# Patient Record
Sex: MALE
[De-identification: names, ages, dates, MRNs, and addresses within clinical notes are randomized per-mention and may not be internally consistent; named-entity substitution may affect disease eponyms.]

---

## 2018-10-03 ENCOUNTER — HOSPITAL ENCOUNTER (OUTPATIENT)
Dept: HOSPITAL 42 - SDS | Age: 68
Discharge: HOME | End: 2018-10-03
Attending: RADIOLOGY
Payer: MEDICARE

## 2018-10-03 VITALS — TEMPERATURE: 97.6 F

## 2018-10-03 VITALS
SYSTOLIC BLOOD PRESSURE: 135 MMHG | DIASTOLIC BLOOD PRESSURE: 79 MMHG | OXYGEN SATURATION: 95 % | RESPIRATION RATE: 20 BRPM | HEART RATE: 76 BPM

## 2018-10-03 VITALS — BODY MASS INDEX: 22.9 KG/M2

## 2018-10-03 DIAGNOSIS — C34.01: Primary | ICD-10-CM

## 2018-10-03 DIAGNOSIS — Z90.2: ICD-10-CM

## 2018-10-03 LAB
APTT BLD: 24.6 SECONDS (ref 25.1–36.5)
BASOPHILS # BLD AUTO: 0.04 K/MM3 (ref 0–2)
BASOPHILS NFR BLD: 0.4 % (ref 0–3)
BUN SERPL-MCNC: 29 MG/DL (ref 7–21)
CALCIUM SERPL-MCNC: 11 MG/DL (ref 8.4–10.5)
EOSINOPHIL # BLD: 0.1 10*3/UL (ref 0–0.7)
EOSINOPHIL NFR BLD: 1 % (ref 1.5–5)
ERYTHROCYTE [DISTWIDTH] IN BLOOD BY AUTOMATED COUNT: 14.4 % (ref 11.5–14.5)
GFR NON-AFRICAN AMERICAN: > 60
GRANULOCYTES # BLD: 7.75 10*3/UL (ref 1.4–6.5)
GRANULOCYTES NFR BLD: 68.6 % (ref 50–68)
HGB BLD-MCNC: 15.4 G/DL (ref 14–18)
INR PPP: 1.09
LYMPHOCYTES # BLD: 2.6 10*3/UL (ref 1.2–3.4)
LYMPHOCYTES NFR BLD AUTO: 22.8 % (ref 22–35)
MCH RBC QN AUTO: 23.4 PG (ref 25–35)
MCHC RBC AUTO-ENTMCNC: 31.2 G/DL (ref 31–37)
MCV RBC AUTO: 75.2 FL (ref 80–105)
MONOCYTES # BLD AUTO: 0.8 10*3/UL (ref 0.1–0.6)
MONOCYTES NFR BLD: 7.2 % (ref 1–6)
PLATELET # BLD: 172 10^3/UL (ref 120–450)
PMV BLD AUTO: 10.7 FL (ref 7–11)
PROTHROMBIN TIME: 12.4 SECONDS (ref 9.4–12.5)
RBC # BLD AUTO: 6.57 10^6/UL (ref 3.5–6.1)
WBC # BLD AUTO: 11.3 10^3/UL (ref 4.5–11)

## 2018-10-03 PROCEDURE — 71045 X-RAY EXAM CHEST 1 VIEW: CPT

## 2018-10-03 PROCEDURE — 88305 TISSUE EXAM BY PATHOLOGIST: CPT

## 2018-10-03 PROCEDURE — 85730 THROMBOPLASTIN TIME PARTIAL: CPT

## 2018-10-03 PROCEDURE — 36415 COLL VENOUS BLD VENIPUNCTURE: CPT

## 2018-10-03 PROCEDURE — 80048 BASIC METABOLIC PNL TOTAL CA: CPT

## 2018-10-03 PROCEDURE — 85610 PROTHROMBIN TIME: CPT

## 2018-10-03 PROCEDURE — 32405: CPT

## 2018-10-03 PROCEDURE — 77012 CT SCAN FOR NEEDLE BIOPSY: CPT

## 2018-10-03 PROCEDURE — 85025 COMPLETE CBC W/AUTO DIFF WBC: CPT

## 2018-10-03 NOTE — CT
PROCEDURE:  CT guided right lung biopsy.



HISTORY:

Previous left pneumonectomy for lung C Large right hilar mass.  

Evaluate for carcinoma. 



PHYSICIAN(S):  Samir Amos MD.



TECHNIQUE:

The relative risks and indications of the procedure were explained to 

the patient and consent obtained. The patient was placed prone on the 

CT scanner and preliminary images through the mid chest obtained. 

Conscious sedation and monitoring were provided throughout the 

procedure by a nurse.



There is an 8 cm right hilar mass.  The patient is status post left 

pneumonectomy..  A right posterior approach was selected and the area 

prepped and draped in the usual sterile fashion. 1% Xylocaine was 

used to anesthetize the skin and soft tissues. A 19 gauge guiding 

needle was advanced into the 8 cm right hilar mass.  Its position was 

confirmed with CT. Using coaxial technique, multiple core biopsies 

were obtained. The postprocedure images show no evidence of large 

pneumothorax or significant hemorrhage..



IMPRESSION:

1. CT-guided right lung biopsy as described above.

## 2018-10-04 NOTE — RAD
Date of service: 



10/03/2018



HISTORY:

 rt lung bx 



COMPARISON:

CT scan earlier same day 



FINDINGS:



LUNGS:

Complete opacification of the left lung.



PLEURA:

No evidence of pneumothorax



CARDIOVASCULAR:

Normal.



OSSEOUS STRUCTURES:

No significant abnormalities.



VISUALIZED UPPER ABDOMEN:

Normal.



OTHER FINDINGS:

None.



IMPRESSION:

There is no evidence of post biopsy pneumothorax on the right

## 2018-10-08 ENCOUNTER — HOSPITAL ENCOUNTER (INPATIENT)
Dept: HOSPITAL 42 - ED | Age: 68
LOS: 9 days | Discharge: NURSING, REHAB OR LONG-TERM CARE | DRG: 974 | End: 2018-10-17
Attending: INTERNAL MEDICINE | Admitting: INTERNAL MEDICINE
Payer: MEDICARE

## 2018-10-08 VITALS — BODY MASS INDEX: 23.3 KG/M2

## 2018-10-08 DIAGNOSIS — R65.20: ICD-10-CM

## 2018-10-08 DIAGNOSIS — I11.0: ICD-10-CM

## 2018-10-08 DIAGNOSIS — C78.1: ICD-10-CM

## 2018-10-08 DIAGNOSIS — A41.9: Primary | ICD-10-CM

## 2018-10-08 DIAGNOSIS — Z66: ICD-10-CM

## 2018-10-08 DIAGNOSIS — Z90.2: ICD-10-CM

## 2018-10-08 DIAGNOSIS — C77.1: ICD-10-CM

## 2018-10-08 DIAGNOSIS — E11.9: ICD-10-CM

## 2018-10-08 DIAGNOSIS — Z99.81: ICD-10-CM

## 2018-10-08 DIAGNOSIS — Z79.84: ICD-10-CM

## 2018-10-08 DIAGNOSIS — J18.9: ICD-10-CM

## 2018-10-08 DIAGNOSIS — E87.6: ICD-10-CM

## 2018-10-08 DIAGNOSIS — Z87.891: ICD-10-CM

## 2018-10-08 DIAGNOSIS — C34.31: ICD-10-CM

## 2018-10-08 DIAGNOSIS — J44.1: ICD-10-CM

## 2018-10-08 DIAGNOSIS — J96.01: ICD-10-CM

## 2018-10-08 DIAGNOSIS — I50.9: ICD-10-CM

## 2018-10-08 DIAGNOSIS — B20: ICD-10-CM

## 2018-10-08 LAB
ALBUMIN SERPL-MCNC: 4.1 G/DL (ref 3–4.8)
ALBUMIN/GLOB SERPL: 0.9 {RATIO} (ref 1.1–1.8)
ALT SERPL-CCNC: 26 U/L (ref 7–56)
ARTERIAL BLOOD GAS O2 SAT: 96.1 % (ref 95–98)
ARTERIAL BLOOD GAS O2 SAT: 99.3 % (ref 95–98)
ARTERIAL BLOOD GAS PCO2: 45 MM/HG (ref 35–45)
ARTERIAL BLOOD GAS PCO2: 48 MM/HG (ref 35–45)
ARTERIAL BLOOD GAS TCO2: 26.8 MMOL.L (ref 22–28)
ARTERIAL BLOOD GAS TCO2: 26.8 MMOL.L (ref 22–28)
AST SERPL-CCNC: 34 U/L (ref 17–59)
BASE EXCESS BLDV CALC-SCNC: -0.7 MMOL/L (ref 0–2)
BASE EXCESS BLDV CALC-SCNC: 1.1 MMOL/L (ref 0–2)
BASOPHILS # BLD AUTO: 0.03 K/MM3 (ref 0–2)
BASOPHILS NFR BLD: 0.2 % (ref 0–3)
BNP SERPL-MCNC: 473 PG/ML (ref 0–450)
BUN SERPL-MCNC: 25 MG/DL (ref 7–21)
CALCIUM SERPL-MCNC: 8.8 MG/DL (ref 8.4–10.5)
EOSINOPHIL # BLD: 0.3 10*3/UL (ref 0–0.7)
EOSINOPHIL NFR BLD: 2.1 % (ref 1.5–5)
ERYTHROCYTE [DISTWIDTH] IN BLOOD BY AUTOMATED COUNT: 14.5 % (ref 11.5–14.5)
GFR NON-AFRICAN AMERICAN: > 60
GRANULOCYTES # BLD: 11.31 10*3/UL (ref 1.4–6.5)
GRANULOCYTES NFR BLD: 80.8 % (ref 50–68)
HCO3 BLDA-SCNC: 25.3 MMOL/L (ref 21–28)
HCO3 BLDA-SCNC: 25.4 MMOL/L (ref 21–28)
HGB BLD-MCNC: 16.7 G/DL (ref 14–18)
INHALED O2 CONCENTRATION: 100 %
INHALED O2 CONCENTRATION: 100 %
LYMPHOCYTES # BLD: 1.7 10*3/UL (ref 1.2–3.4)
LYMPHOCYTES NFR BLD AUTO: 11.9 % (ref 22–35)
MCH RBC QN AUTO: 23.6 PG (ref 25–35)
MCHC RBC AUTO-ENTMCNC: 31.5 G/DL (ref 31–37)
MCV RBC AUTO: 75.1 FL (ref 80–105)
MONOCYTES # BLD AUTO: 0.7 10*3/UL (ref 0.1–0.6)
MONOCYTES NFR BLD: 5 % (ref 1–6)
PH BLDA: 7.33 [PH] (ref 7.35–7.45)
PH BLDA: 7.36 [PH] (ref 7.35–7.45)
PH BLDV: 7.24 [PH] (ref 7.32–7.43)
PH BLDV: 7.32 [PH] (ref 7.32–7.43)
PLATELET # BLD: 196 10^3/UL (ref 120–450)
PMV BLD AUTO: 11.4 FL (ref 7–11)
PO2 BLDA: 153 MM/HG (ref 80–100)
PO2 BLDA: 72 MM/HG (ref 80–100)
RBC # BLD AUTO: 7.07 10^6/UL (ref 3.5–6.1)
TROPONIN I SERPL-MCNC: 0.06 NG/ML
VENOUS BLOOD FIO2: 21 %
VENOUS BLOOD FIO2: 21 %
VENOUS BLOOD GAS PCO2: 55 (ref 40–60)
VENOUS BLOOD GAS PCO2: 66 (ref 40–60)
VENOUS BLOOD GAS PO2: 32 MM/HG (ref 30–55)
VENOUS BLOOD GAS PO2: 67 MM/HG (ref 30–55)
WBC # BLD AUTO: 14 10^3/UL (ref 4.5–11)

## 2018-10-08 PROCEDURE — 5A09357 ASSISTANCE WITH RESPIRATORY VENTILATION, LESS THAN 24 CONSECUTIVE HOURS, CONTINUOUS POSITIVE AIRWAY PRESSURE: ICD-10-PCS | Performed by: INTERNAL MEDICINE

## 2018-10-08 RX ADMIN — MEROPENEM SCH MLS/HR: 1 INJECTION INTRAVENOUS at 14:00

## 2018-10-08 RX ADMIN — MEROPENEM SCH MLS/HR: 1 INJECTION INTRAVENOUS at 21:36

## 2018-10-08 RX ADMIN — METHYLPREDNISOLONE SODIUM SUCCINATE SCH MG: 40 INJECTION, POWDER, FOR SOLUTION INTRAMUSCULAR; INTRAVENOUS at 21:37

## 2018-10-08 RX ADMIN — METHYLPREDNISOLONE SODIUM SUCCINATE SCH MG: 40 INJECTION, POWDER, FOR SOLUTION INTRAMUSCULAR; INTRAVENOUS at 15:00

## 2018-10-08 RX ADMIN — IPRATROPIUM BROMIDE AND ALBUTEROL SULFATE SCH ML: .5; 3 SOLUTION RESPIRATORY (INHALATION) at 13:48

## 2018-10-08 RX ADMIN — IPRATROPIUM BROMIDE AND ALBUTEROL SULFATE SCH ML: .5; 3 SOLUTION RESPIRATORY (INHALATION) at 19:44

## 2018-10-08 NOTE — RAD
Date of service: 



10/08/2018



HISTORY:

 sob 



COMPARISON:

10/03/2018 



FINDINGS:



LUNGS:

There is a new patchy alveolar infiltrate in the right lung.  There 

is no change in the opacified left lung



PLEURA:

No significant pleural effusion identified, no pneumothorax apparent.



CARDIOVASCULAR:

Normal.



OSSEOUS STRUCTURES:

No significant abnormalities.



VISUALIZED UPPER ABDOMEN:

Normal.



OTHER FINDINGS:

None.



IMPRESSION:

There is a new patchy alveolar infiltrate in the right lung.  There 

is no change in the opacified left lung

## 2018-10-08 NOTE — CON
DATE:  10/08/2018

PULMONARY CONSULTATION



REASON FOR PULMONARY CONSULTATION:  Shortness of breath.



REFERRING PHYSICIAN:  Rodney Lawrence MD.



HISTORY OF PRESENT ILLNESS:  History is obtained via extensive discussion

with the patient and his family.  I have also reviewed the chart at length.



The patient is a chronically ill 68-year-old male, with past medical history

significant for advanced lung cancer (status post left pneumonectomy in

2014); newly diagnosed/large cancer recurrence in the right lung; advanced

chronic obstructive pulmonary disease, on home oxygen; hypertension; HIV

positivity; who presents to [facility] with a 1-day history of

increasing shortness of breath at rest, dyspnea on exertion and cough. 

There is no history of significant sputum production.  There is no history

of chest pain, coughing up of blood or chest pain - made worse with deep

respirations.  The patient did present to [facility] with

low-grade temperatures (99.0).  No history of chills or infectious exposure. 

No history of night sweats, weight loss or appetite change prior to the

above events.  No history of calf pains.  No history of syncope or

diaphoresis.  No history of recent travel or trauma.



REVIEW OF SYSTEMS:  No history of nausea, vomiting or diarrhea.  No acute

urinary symptoms.  No new neurologic complaints.  Rest of the review of

systems is negative.



ALLERGIES:  NO KNOWN ALLERGIES.



SOCIAL HISTORY:  Positive for extensive tobacco usage.  No alcohol. 

Previous intravenous drug abuse - many years ago.



FAMILY HISTORY:  No inheritable diseases.



HOME MEDICATIONS:  Include Percocet, Glucophage, Valium, Norvasc, Breo

Ellipta, Ventolin HFA and Atripla.



PHYSICAL EXAMINATION:

GENERAL:  The patient is mildly short of breath at rest.  He is no acute

distress.  He is currently on BiPAP.

VITAL SIGNS:  Temperature is 99, pulse on the monitor is 101, respiratory

rate 20/22, blood pressure 115/77.  Oxygen saturation on BiPAP is 95%.

HEENT:  Normocephalic, atraumatic.  No JVD.

CARDIOVASCULAR:  Positive S1, S2.  No S3 gallop.

LUNGS:  Scattered rhonchi with a few wheezes noted in the right lung. 

Decreased breath sounds - left lung.

EXTREMITIES:  No clubbing, cyanosis or edema.  Calves are nontender to

palpation.

GI:  Abdomen is soft, nontender and nondistended.  Bowel sounds are

positive.

SKIN:  No acute rash.

NEUROLOGIC:  Limited at the present time.



PERTINENT LABORATORY DATA:  Chest x-ray was done today and reviewed. 

Compared to the chest x-ray done on 10/03/2018, the chest x-ray today

appears to show a new right lower lobe infiltrate.  The patient also had a

recent PET scan done on 09/26/2018.  The patient has a very large (approximately

9 cm) multilobulated FDG-avid mass noted in the right lower lobe with

extension to the right hilum.  There is also FDG-avid metastatic

adenopathy noted in the mediastinum and the hilum.  Lung biopsy of the mass was

done on 10/03/2018.  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

carcinoma.  CBC:  White count 14K, hemoglobin 16.7, hematocrit 53.1,

platelets of 196,000.  Arterial blood gas was done on 100% oxygen.  Results

are:  PH 7.33, pCO2 of 48, pO2 of 72.  Complete metabolic profile:  BUN 25,

glucose 253.  Troponin 0.06.  B-type natriuretic peptide 473, total protein

8.4.  Rest of the metabolic profile is within normal limits.



IMPRESSION:

1.  Acute hypoxemic respiratory failure.

2.  Rule out pneumonia, right lower lobe.

3.  Advanced lung cancer.

4.  Advanced chronic obstructive pulmonary disease - on home oxygen.

5.  Mild bronchospasm.



PLAN:  Again, I did discuss the case with the patient and family at length.

I have also reviewed the chart at length.



The patient presents to [facility] with a 1-day history of

increasing pulmonary symptoms.  Again, I did review the chest x-ray - done

earlier today.  Compared to the chest x-ray done on 10/03/2018, the newest

chest x-ray shows a  right lower lobe infiltrate (new).  I have

also reviewed the recent PET scan done.  A very large right lung mass with

metastatic disease to the mediastinum and hilum are noted.  I have also

reviewed the arterial blood gas.  A mild respiratory acidosis is noted,

along with a significant increase in the alveolar-arterial gradient.  Since

the arterial blood gas was done, the patient was placed on BiPAP.  On

physical exam, there is certainly mild bronchospasm noted.  The patient

also got vancomycin and Zosyn in the emergency room.  Pancultures are

ordered. Infectious disease consult is also ordered.



The patient appears critically ill, with overall poor prognosis.  I did have

a private conversation with the wife this morning.  She appears realistic,

knowing that her  has an advanced cancer.  I also discussed the case

at length with Dr. Martinez (ICU).  The ICU team will write the admitting

orders shortly.  The patient will be transitioned to the ICU as soon as 
possible. 

I will also discuss the above with the attending physician.  Thank you very

much for this pulmonary consultation.





__________________________________________

Bennie Shepherd MD





DD:  10/08/2018 9:23:21

DT:  10/08/2018 9:29:47

Job # [id number]



[email or web address]

## 2018-10-08 NOTE — PCM.SEPTIC
Sepsis Progress Note





- Reassessment Type


Date of Evaluation: 10/08/18


Time of Evaluation: 13:00


Reassessment Type: Non-invasive reassessment





- Non Invasive Reassessment


Were the most recent vital sign reviewed: Yes


Vital Sign (Latest): 


                                        











Temp Pulse Resp BP Pulse Ox


 


 99.0 F   101 H  22   120/84   100 


 


 10/08/18 07:19  10/08/18 11:19  10/08/18 11:19  10/08/18 11:19  10/08/18 11:19








Cardiovascular: Yes: Regular Rate, Rhythm, Tachycardia


Respiratory: Yes: Normal Breath Sounds, Other (w/ face mask)


Capillary Refill: Normal (Less than 2 sec)


Skin: Normal Color, Warm


Fluid Challenge performed: No

## 2018-10-08 NOTE — PCM.SEPTIC
Sepsis Progress Note





- Non Invasive Reassessment


Vital Sign (Latest): 


                                        











Temp Pulse Resp BP Pulse Ox


 


 99.0 F   101 H  22   120/84   100 


 


 10/08/18 07:19  10/08/18 11:19  10/08/18 11:19  10/08/18 11:19  10/08/18 11:19

## 2018-10-08 NOTE — US
HISTORY:

Leg pain and swelling. Evaluate for DVT



PHYSICIAN(S):  Samir Amos MD.



TECHNIQUE:

Duplex sonography and color-flow Doppler with graded compression were 

used to evaluate the deep venous systems of both lower extremities. 



FINDINGS:

The visualized deep venous systems of both lower extremities are 

sonographically normal and compressible. Normal wave forms and 

augmentation are seen. There is no sonographic evidence for deep 

venous thrombosis in the visualized segments of both lower 

extremities.



IMPRESSION:

No sonographic evidence for deep venous thrombosis in the visualized 

segments of both lower extremities.

## 2018-10-08 NOTE — CARD
--------------- APPROVED REPORT --------------





Date of service: 10/08/2018



EKG Measurement

Heart [id number]

OH [id number]

[id number]

[id number]

[id number]



<Conclusion>

Sinus tachycardia

Rightward axis

Voltage criteria for left ventricular hypertrophy

Cannot rule out Septal infarct, age undetermined

Marked ST abnormality, 

Correlate Clinically.

Abnormal ECG

## 2018-10-08 NOTE — CP.PCM.CON
History of Present Illness





- History of Present Illness


History of Present Illness: 


Shivam Dow DO, PGY-1 ICU Consult Note for Dr. Martinez


Patient is a 68 year old M with PMH of metastatic lung cancer (s/p L lung 

resection, now with newly diagnosed 9 cm lung mass in R lung), HTN, and HIV who 

presents to ED with SOB since awakening for the past 2 hours. Patient states he 

woke up this morning and was short of breath at rest. When he tried to get up to

the bathroom he was hardly able to move without gasping for air. He admits to 

being diaphoretic at the time and states he took a baby aspirin because he 

though the might be having a heart attack. He was recently being treated with 

levaquin for presumed PNA but states that Dr. Lawrence told him that it was a 

recurrence of cancer on the right and not PNA. Levaquin was stopped.


In ED, he was placed on Bipap and patient states his breathing has improved 

since. He states his oncologist practices out of [facility] [place] but states they chose to 

come to this hospital because it was closer. 


Currently patient denies fever/chills, CP, nausea/vomiting/diarrhea, 

diaphoresis, peripheral swelling, HA or vision changes.





PMH: metastatic lung cancer, HTN, HIV


PSH: L lung resection


All: NKA


Fam Hx: non-contributory


Meds: reviewed with patient's family, consistent with EMR








Review of Systems





- Constitutional


Constitutional: absent: Chills, Fever, Headache, Night Sweats





- EENT


Eyes: absent: Change in Vision


Nose/Mouth/Throat: As Per HPI





- Cardiovascular


Cardiovascular: absent: Chest Pain, Dyspnea





- Respiratory


Respiratory: As Per HPI





- Gastrointestinal


Gastrointestinal: absent: Abdominal Pain, Nausea, Vomiting





- Genitourinary


Genitourinary: absent: Change in Urinary Stream





Past Patient History





- Infectious Disease


Hx of Infectious Diseases: None





- Past Social History


Smoking Status: Unknown If Ever Smoked





- CARDIAC


Hx Pacemaker: No





- PULMONARY


Hx Lung Cancer: Yes


Other/Comment: Left pneumonectomy.  Mass noted to the Rt. lung





- HEMATOLOGICAL/ONCOLOGICAL


Hx Blood Transfusions: No





- MUSCULOSKELETAL/RHEUMATOLOGICAL


Hx Musculoskeletal Disorders: No





- PSYCHIATRIC


Hx Emotional Abuse: No


Hx Physical Abuse: No





- SURGICAL HISTORY


Hx Surgeries: Yes (LEFT PNEUMONECTOMY, BILATERAL MENISCUS SURGERY)





- ANESTHESIA


Hx Anesthesia Reactions: No





Meds


Allergies/Adverse Reactions: 


                                    Allergies











Allergy/AdvReac Type Severity Reaction Status Date / Time


 


No Known Allergies Allergy   Verified 10/08/18 07:29














- Medications


Medications: 


                               Current Medications





Vancomycin HCl (Vancomycin 1gm)  1 gm in 250 mls @ 167 mls/hr IVPB STAT STA; 

Protocol


   Stop: 10/08/18 09:32


Sodium Chloride (Sodium Chloride 0.9%)  1,000 mls @ 100 mls/hr IV .Q10H ALBINA


   Last Admin: 10/08/18 08:14 Dose:  100 mls/hr











Physical Exam





- Constitutional


Appears: No Acute Distress, Agitated





- Head Exam


Head Exam: ATRAUMATIC, NORMAL INSPECTION





- Eye Exam


Eye Exam: EOMI, PERRL





- ENT Exam


ENT Exam: Mucous Membranes Dry





- Neck Exam


Neck exam: Positive for: Full Rom, Normal Inspection





- Respiratory Exam


Respiratory Exam: Accessory Muscle Use, Decreased Breath Sounds (breath sounds 

absent on L, c/w L left resection), Prolonged Expiratory Phase.  absent: Rales, 

Rhonchi, Wheezes, Stridor





- Cardiovascular Exam


Cardiovascular Exam: REGULAR RHYTHM, RRR, +S1, +S2.  absent: Diastolic murmur, 

Gallop, Rubs, Systolic Murmur





- GI/Abdominal Exam


GI & Abdominal Exam: Soft.  absent: Guarding, Rebound





- Extremities Exam


Extremities exam: Positive for: full ROM, normal inspection.  Negative for: 

pedal edema





- Neurological Exam


Neurological exam: Alert, Oriented x3





- Psychiatric Exam


Psychiatric exam: Anxious





- Skin


Skin Exam: Dry, Intact, Normal Color, Warm





Results





- Vital Signs


Recent Vital Signs: 


                                Last Vital Signs











Temp  99.0 F   10/08/18 07:19


 


Pulse  125 H  10/08/18 07:40


 


Resp  22   10/08/18 07:40


 


BP  115/77   10/08/18 07:40


 


Pulse Ox  95   10/08/18 07:40














- Labs


Result Diagrams: 


                                 10/08/18 07:15





                                 10/08/18 07:15


Labs: 


                         Laboratory Results - last 24 hr











  10/08/18 10/08/18 10/08/18





  07:11 07:15 07:15


 


WBC   14.0 H D 


 


RBC   7.07 H 


 


Hgb   16.7 


 


Hct   53.1 H 


 


MCV   75.1 L 


 


MCH   23.6 L 


 


MCHC   31.5 


 


RDW   14.5 


 


Plt Count   196 


 


MPV   11.4 H 


 


Gran %   80.8 H 


 


Lymph % (Auto)   11.9 L 


 


Mono % (Auto)   5.0 


 


Eos % (Auto)   2.1 


 


Baso % (Auto)   0.2 


 


Gran #   11.31 H 


 


Lymph # (Auto)   1.7 


 


Mono # (Auto)   0.7 H 


 


Eos # (Auto)   0.3 


 


Baso # (Auto)   0.03 


 


pCO2   


 


pO2  32  


 


HCO3   


 


ABG pH   


 


ABG Total CO2   


 


ABG O2 Saturation   


 


ABG Base Excess   


 


ABG Potassium   


 


VBG pH  7.24 L  


 


VBG pCO2  66.0 H*  


 


VBG HCO3  28.3 H  


 


VBG Total CO2  30.3 H  


 


VBG O2 Sat (Calc)  59.6  


 


VBG Base Excess  -0.7 L  


 


VBG Potassium  4.4  


 


Sodium  141.0   144


 


Chloride  106.0   106


 


Glucose  257 H  


 


Lactate  2.3 H  


 


FiO2  21.0  


 


Potassium    4.1


 


Carbon Dioxide    28


 


Anion Gap    14


 


BUN    25 H


 


Creatinine    1.0


 


Est GFR ( Amer)    > 60


 


Est GFR (Non-Af Amer)    > 60


 


Random Glucose    253 H


 


Calcium    8.8


 


Total Bilirubin    0.6


 


AST    34


 


ALT    26


 


Alkaline Phosphatase    126  D


 


Troponin I    0.06


 


NT-Pro-B Natriuret Pep    473 H


 


Total Protein    8.4 H


 


Albumin    4.1


 


Globulin    4.4


 


Albumin/Globulin Ratio    0.9 L


 


TSH 3rd Generation   


 


Arterial Blood Potassium   


 


Venous Blood Potassium  4.4  


 


Blood Type   


 


Antibody Screen   


 


BBK History Checked   














  10/08/18 10/08/18 10/08/18





  07:15 07:15 07:45


 


WBC   


 


RBC   


 


Hgb   


 


Hct   


 


MCV   


 


MCH   


 


MCHC   


 


RDW   


 


Plt Count   


 


MPV   


 


Gran %   


 


Lymph % (Auto)   


 


Mono % (Auto)   


 


Eos % (Auto)   


 


Baso % (Auto)   


 


Gran #   


 


Lymph # (Auto)   


 


Mono # (Auto)   


 


Eos # (Auto)   


 


Baso # (Auto)   


 


pCO2    48 H


 


pO2    72.0 L


 


HCO3    25.3


 


ABG pH    7.33 L


 


ABG Total CO2    26.8


 


ABG O2 Saturation    96.1


 


ABG Base Excess    -1.1


 


ABG Potassium    3.4 L


 


VBG pH   


 


VBG pCO2   


 


VBG HCO3   


 


VBG Total CO2   


 


VBG O2 Sat (Calc)   


 


VBG Base Excess   


 


VBG Potassium   


 


Sodium    141.0


 


Chloride    110.0 H


 


Glucose    205 H


 


Lactate    1.1


 


FiO2    100.0


 


Potassium   


 


Carbon Dioxide   


 


Anion Gap   


 


BUN   


 


Creatinine   


 


Est GFR ( Amer)   


 


Est GFR (Non-Af Amer)   


 


Random Glucose   


 


Calcium   


 


Total Bilirubin   


 


AST   


 


ALT   


 


Alkaline Phosphatase   


 


Troponin I   


 


NT-Pro-B Natriuret Pep   


 


Total Protein   


 


Albumin   


 


Globulin   


 


Albumin/Globulin Ratio   


 


TSH 3rd Generation  2.30  


 


Arterial Blood Potassium    3.4 L


 


Venous Blood Potassium   


 


Blood Type   B POSITIVE 


 


Antibody Screen   Negative 


 


BBK History Checked   No verified bt 














Assessment & Plan





- Assessment and Plan (Free Text)


Assessment: 





67 yo M with PMH of metastatic lung CA, HTN, and HIV presents to ED with acute, 

worsening SOB with hypoxemia improving with Bipap admitted to ICU for additional

management/monitoring.


Plan: 





Neuro:


-AAOx3, no FND, moving extremities past midline.  


-Monitor neuro status.   


-Reorient patient as necessary.





Cardio:


-Tachycardia likely 2/2 anxiety and/or physiologic response to hypoxemia


-Hypertensive on admission, improving


-Maintain MAP > 65


-Monitor HR in MICU





Pulm:


-Acute hypercapnic respiratory failure likely 2/2 underlying tumor burden vs 

component of COPD exacerbation


-Faint expiratory wheezes on right, absent breath sounds LLL c/w hx of lobectomy


-Improving on Bipap


-Will start on empiric antibiotics, duo-neb Q6h, and solumedrol 40 mg IVP Q8h 


-Maintain SpO2 > 95%


-Will monitor respiratory status in MICU





GI:


-Protonix for PPX





/Nephro:


-Maintain euvolemia


-BUN/Cr stable


-Monitor UOP


-Replete electrolytes as needed





Heme/Onc:


-Patient states he has a newly diagnosed 9 cm mass in the R lung


-Patient's primary oncologist in  is Dr. Reynoso and pulmonologist is Dr. Vizcarra


-H/H stable currently, continue to monitor





ID:


-Leukocytosis of 14 noted


-CXR with possible RLL infiltrate vs mass


-Treated empirically with vanc and zosyn


-Azithromycin for presumed COPD exacerbation


-ID consulted recs appreciated





GI/DVT PPX: Protonix and SC heparin


Full Code


Monitor in MICU





Case and plan discussed with my attending Dr. Michelle Dow, DO


IM Resident PGY-1

## 2018-10-08 NOTE — ED PDOC
Arrival/HPI





- General


Chief Complaint: Shortness Of Breath


Time Seen by Provider: 10/08/18 07:10


Historian: Patient





- Critical Care


Critical Care Minutes: 30 minutes





- History of Present Illness


Narrative History of Present Illness (Text): 





68 yr old male w/ hx of Lung ca on home o2 3L, L Lobectomy, HTN, HIV p/w SOB. 

Acute SOB per EMS and family this morning 30 minutes prior to arrival, desat to 

59%. Denies any chest pain or abdominal pain or Headache. No recent fever chills

or night sweat. No Nausea or vomiting. Pt was recently rx w/ levoquin 3 weeks 

prior for ?PNA, but was found to have re-occurence of CA, 9cm mass to R lung. Pt

had chemo scheduled to be started. Per family pts Viral load has been low and 

last Cd4 count was very good.  





PMD: Howard


Pulm: Zackery ([facility] [place])


Onc: Angeles ([facility] [place])











Past Medical History





- Provider Review


Nursing Documentation Reviewed: Yes





- Cardiac


Hx Pacemaker: No





- Hematological/Oncological


Hx Blood Transfusions: No





- Musculoskeletal/Rheumatological


Hx Musculoskeletal Disorders: No





- Psychiatric


Hx Emotional Abuse: No


Hx Physical Abuse: No





- Anesthesia


Hx Anesthesia Reactions: No





- Suicidal Assessment


Feels Threatened In Home Enviroment: No





Family/Social History


Family/Social History: Unknown Family HX


Hx Alcohol Use: No





Allergies/Home Meds


Allergies/Adverse Reactions: 


Allergies





No Known Allergies Allergy (Verified 10/08/18 07:29)


   








Home Medications: 


                                    Home Meds











 Medication  Instructions  Recorded  Confirmed


 


Albuterol Sulfate [Ventolin Hfa] 1 puff IH QID 10/01/18 10/08/18


 


Breo Ellipta 100-25 Mcg INH 1 puff INH BID 10/01/18 10/08/18


 


Efavirenz/Emtricitabine/Teno 1 tab PO DAILY 10/01/18 10/08/18





[Atripla 600 MG-200 MG-300 MG]   


 


Ibuprofen/Diphenhydramine Cit 1 each PO HS 10/01/18 10/08/18





[Advil Pm Caplet]   


 


Multivitamin/Iron/Folic Acid 1 tab PO DAILY 10/01/18 10/08/18





[Centrum Adults Tablet]   


 


amLODIPine [Norvasc] 5 mg PO DAILY 10/01/18 10/08/18


 


diaZEpam [Valium] 5 mg PO HS 10/01/18 10/08/18


 


metFORMIN [glucOPHAGE] 500 mg PO DAILY 10/01/18 10/08/18


 


oxyCODONE/Acetaminophen [Percocet 1 tab PO PRN PRN 10/01/18 10/08/18





5/325 mg Tab]   














Review of Systems





- Review of Systems


Systems not reviewed;Unavailable: Acuity of Condition





Physical Exam


Blood Pressure: Hypertensive


Pulse: Tachycardic


Respiratory Rate: Tachypneic


Appearance: Positive for: Ill-Appearing


Pain Distress: None


Mental Status: Positive for: Alert and Oriented X 3





- Systems Exam


Head: Present: Atraumatic


Pupils: Present: PERRL


Extroacular Muscles: Present: EOMI


Conjunctiva: Present: Normal


Mouth: Present: Moist Mucous Membranes


Neck: Present: Normal Range of Motion.  No: Meningeal Signs, MIDLINE TENDERNESS


Respiratory/Chest: Present: Other (On Bipap, No LS on L side, R side w/ out 

wheezes or rales or crackles or rhonchi)


Cardiovascular: Present: Tachycardic


Abdomen: Present: Normal Bowel Sounds.  No: Tenderness, Distention


Upper Extremity: Present: Normal Inspection.  No: Cyanosis, Edema


Lower Extremity: Present: Normal Inspection.  No: Edema


Neurological: Present: GCS=15


Skin: Present: Warm, Dry.  No: Rashes


Psychiatric: Present: Alert, Oriented x 3





Medical Decision Making


ED Course and Treatment: 





68 yr old male w/ hx of HIV, HTN, Lung CA on 3L home o2 p/w sob. SOB 2/2 tumor 

burden vs PNA vs PE. Will seek imaging and labs. o2 sats and work of breathing 

Improved w/ Bipap. Per family pt is full code.





Pending labs and imaging. 





EKG: Ordered, reviewed, and independently interpreted the EKG.


Rate : 128 BPM


Rhythm : Sinus tachycardia


Interpretation : No STEMI.





10/08/18 08:10


Xray Reviewed: R infinitrate vs Inflammation from mass. Will rx w/ abx given hx 

of HIV and Tachy, w/ elevated WBC. Lactic 2.2.


Code Sepsis called.


appreciate consult w/ Dr. Lawrence: PMD: to be admitted to his service: 

requests Dr. Shepherd for Pulm consult. Consult placed. 


Appreciate consult w/ Dr. Roque: ICU: to be seen. 





10/08/18 08:39


Accepted by Dr. Roque: ICU: I endorsed need for possible CT-PE for tachy and 

SOB 


Pt in NAD, notes improvement in breathing, more comfortable. 








- Lab Interpretations


I have reviewed the lab results: Yes





- RAD Interpretation


Radiology Orders: 











10/08/18 07:11


CHEST PORTABLE [RAD] Stat 














- Medication Orders


Current Medication Orders: 











Albuterol/Ipratropium (Duoneb 3 Mg/0.5 Mg (3 Ml) Ud)  3 ml IH STAT STA


   Stop: 10/08/18 07:14











Disposition/Present on Arrival





- Present on Arrival


Any Indicators Present on Arrival: No





- Disposition


Have Diagnosis and Disposition been Completed?: Yes


Diagnosis: 


 Lung mass, PNA (pneumonia)





Disposition: HOSPITALIZED


Disposition Time: 08:15


Patient Plan: ICU


Condition: GUARDED


Forms:  [facility] (English)

## 2018-10-08 NOTE — CON
DATE:  10/08/2018



HISTORY OF PRESENT ILLNESS:  This is a 68-year-old gentleman with history

of COPD; recurrent nonsmall lung CA, status post left lobectomy; who

presented to [facility] with chief complaint of acute shortness

of breath that started about 30 minutes prior to arrival with oxygen

saturation to 59%.  The patient denies any chest pain, abdominal pain or

headache.  No hemoptysis.  No nausea, vomiting, diarrhea, or constipation.



PAST MEDICAL HISTORY:  Left lobectomy for lung cancer, hypertension, HIV.



HOME MEDICATIONS:  Ventolin, Breo Ellipta, Atripla,  Advil PM,

multivitamins, Norvasc, Valium, metformin, Percocet.



ALLERGIES:  NKDA.



REVIEW OF SYSTEMS:  Revealed 12-organ systems other than mentioned in the

history of present illness is negative.



SOCIAL HISTORY:  No alcohol or illicit drug abuse.  No tobacco smoking at

presently.  The patient is an ex-smoker, though.



PHYSICAL EXAMINATION:

VITAL SIGNS:  Heart rate 111, blood pressure 132/88, respiratory rate 26,

oxygen saturation 99% on BiPAP.

ENT:  Head and neck atraumatic.

LUNGS:  No breath sounds on the left side.  Few crackles on the right side.

HEART:  Regular rate and rhythm.  S1, S2 normal.

ABDOMEN:  Soft, nontender and nondistended.

MUSCULOSKELETAL:  No C/C/E.

NEUROLOGIC:  The patient moves all extremities spontaneously.

SKIN:  Moist.

PSYCH:  The patient is alert, awake and oriented x3.



LABORATORY DATA:  WBC 15, hemoglobin 16.7, platelet count 196.  Sodium 144,

potassium 4.1, chloride 106, carbon dioxide 28, BUN 25, creatinine 1,

glucose 253, proBNP 473, troponin 0.06, AST 34, ALT 26, total bilirubin

0.6.  Chest x-ray showed complete volume loss on the left side,

substantially increased interstitial markings on the right side,

substantial tracheal deviation to the left, right lower lobe mass.



EKG showed sinus tachycardia with some ST depression in V5 and V6.



ASSESSMENT AND PLAN:  This is a 68-year-old gentleman, who presented with

acute shortness of breath in the setting of human immunodeficiency virus

and right lower lobe mass, representing malignancy.  Differential diagnosis

includes infectious and noninfectious etiology.  Typical and atypical

pathogens are on differential list.  Possibility of Pneumocystis carinii

pneumonia cannot be ruled out as well. ID will weigh in whether Bactrim should 
be started. Bronch would be too risky provided fi02 requireiemnt. Infectious 
Disease Service

following on CD4 count, viral load and antibiotics.  Possibility of

pulmonary embolism cannot be discarded as well.  We will proceed with

echocardiogram, venous Doppler of lower extremities.  Septic workup will be

initiated.  Urine for Legionella and streptococcal antigen will be sent. 

Procalcitonin will be ordered.  Blood, urine culture will be obtained.  The

patient currently is on meropenem and vancomycin.  The patient is on

azithromycin as well.  The patient is on heparin subcu for deep venous

thrombosis prophylaxis.  DuoNeb and steroid taper were ordered.  We will

continue to target euvolemia, euglycemia, normothermia and oxygen

saturation more than 90%.  We will continue with bilevel positive airway

pressure and we will repeat ABG to ascertain the improvement in ventilatory

and gas exchange parameters.



ccm time 40 min





__________________________________________

Iam Martinez MD





DD:  10/08/2018 14:38:12

DT:  10/08/2018 18:11:11

Job # [id number]

MTDALY

## 2018-10-09 LAB
ALBUMIN SERPL-MCNC: 3.2 G/DL (ref 3–4.8)
ALBUMIN/GLOB SERPL: 0.9 {RATIO} (ref 1.1–1.8)
ALT SERPL-CCNC: 27 U/L (ref 7–56)
AST SERPL-CCNC: 27 U/L (ref 17–59)
BASOPHILS # BLD AUTO: 0.01 K/MM3 (ref 0–2)
BASOPHILS NFR BLD: 0.1 % (ref 0–3)
BUN SERPL-MCNC: 21 MG/DL (ref 7–21)
CALCIUM SERPL-MCNC: 8.1 MG/DL (ref 8.4–10.5)
EOSINOPHIL # BLD: 0 10*3/UL (ref 0–0.7)
EOSINOPHIL NFR BLD: 0.1 % (ref 1.5–5)
ERYTHROCYTE [DISTWIDTH] IN BLOOD BY AUTOMATED COUNT: 14.3 % (ref 11.5–14.5)
GFR NON-AFRICAN AMERICAN: > 60
GRANULOCYTES # BLD: 7.47 10*3/UL (ref 1.4–6.5)
GRANULOCYTES NFR BLD: 79.5 % (ref 50–68)
HGB BLD-MCNC: 14 G/DL (ref 14–18)
LYMPHOCYTES # BLD: 1.3 10*3/UL (ref 1.2–3.4)
LYMPHOCYTES NFR BLD AUTO: 13.6 % (ref 22–35)
MCH RBC QN AUTO: 23.3 PG (ref 25–35)
MCHC RBC AUTO-ENTMCNC: 31 G/DL (ref 31–37)
MCV RBC AUTO: 74.9 FL (ref 80–105)
MONOCYTES # BLD AUTO: 0.6 10*3/UL (ref 0.1–0.6)
MONOCYTES NFR BLD: 6.7 % (ref 1–6)
PLATELET # BLD: 193 10^3/UL (ref 120–450)
PMV BLD AUTO: 10.9 FL (ref 7–11)
RBC # BLD AUTO: 6.02 10^6/UL (ref 3.5–6.1)
WBC # BLD AUTO: 9.4 10^3/UL (ref 4.5–11)

## 2018-10-09 RX ADMIN — METHYLPREDNISOLONE SODIUM SUCCINATE SCH MG: 40 INJECTION, POWDER, FOR SOLUTION INTRAMUSCULAR; INTRAVENOUS at 05:32

## 2018-10-09 RX ADMIN — EFAVIRENZ, EMTRICITABINE, AND TENOFOVIR DISOPROXIL FUMARATE SCH TAB: 600; 200; 300 TABLET, FILM COATED ORAL at 22:21

## 2018-10-09 RX ADMIN — METHYLPREDNISOLONE SODIUM SUCCINATE SCH MG: 40 INJECTION, POWDER, FOR SOLUTION INTRAMUSCULAR; INTRAVENOUS at 22:10

## 2018-10-09 RX ADMIN — IPRATROPIUM BROMIDE AND ALBUTEROL SULFATE SCH ML: .5; 3 SOLUTION RESPIRATORY (INHALATION) at 20:39

## 2018-10-09 RX ADMIN — IPRATROPIUM BROMIDE AND ALBUTEROL SULFATE SCH ML: .5; 3 SOLUTION RESPIRATORY (INHALATION) at 07:23

## 2018-10-09 RX ADMIN — SULFAMETHOXAZOLE AND TRIMETHOPRIM SCH MLS/HR: 80; 16 INJECTION, SOLUTION, CONCENTRATE INTRAVENOUS at 22:16

## 2018-10-09 RX ADMIN — IPRATROPIUM BROMIDE AND ALBUTEROL SULFATE SCH ML: .5; 3 SOLUTION RESPIRATORY (INHALATION) at 13:15

## 2018-10-09 RX ADMIN — INSULIN LISPRO SCH: 100 INJECTION, SOLUTION INTRAVENOUS; SUBCUTANEOUS at 22:30

## 2018-10-09 RX ADMIN — IPRATROPIUM BROMIDE AND ALBUTEROL SULFATE SCH ML: .5; 3 SOLUTION RESPIRATORY (INHALATION) at 02:54

## 2018-10-09 RX ADMIN — INSULIN LISPRO SCH: 100 INJECTION, SOLUTION INTRAVENOUS; SUBCUTANEOUS at 11:36

## 2018-10-09 RX ADMIN — MEROPENEM SCH MLS/HR: 1 INJECTION INTRAVENOUS at 05:32

## 2018-10-09 RX ADMIN — MEROPENEM SCH MLS/HR: 1 INJECTION INTRAVENOUS at 13:27

## 2018-10-09 RX ADMIN — METHYLPREDNISOLONE SODIUM SUCCINATE SCH MG: 40 INJECTION, POWDER, FOR SOLUTION INTRAMUSCULAR; INTRAVENOUS at 13:28

## 2018-10-09 RX ADMIN — SULFAMETHOXAZOLE AND TRIMETHOPRIM SCH MLS/HR: 80; 16 INJECTION, SOLUTION, CONCENTRATE INTRAVENOUS at 15:38

## 2018-10-09 RX ADMIN — MEROPENEM SCH MLS/HR: 1 INJECTION INTRAVENOUS at 22:11

## 2018-10-09 NOTE — PN
DATE:  10/09/2018(630am-720am)



PULMONARY NOTE



SUBJECTIVE:  The patient appears comfortable this morning.  He is not short

of breath at rest.



PHYSICAL EXAMINATION:

VITAL SIGNS:  Temperature is 98, pulse 87, respirations 18-20, blood

pressure 130/91.  Oxygen saturation on high-flow delivery is 95%.

HEENT:  Normocephalic, atraumatic.  No JVD.

CARDIOVASCULAR:  Positive S1, S2.  No S3 gallop.

LUNGS:  Less rhonchi, less wheezing noted in the right lung.  Decreased

breath sounds - left lung.

EXTREMITIES:  No clubbing, cyanosis, or edema.  Calves are nontender to

palpation.

GASTROINTESTINAL:  Abdomen is soft, nontender, and nondistended.  Bowel

sounds are positive.

SKIN:  No acute rash.

NEUROLOGIC:  Limited at the present time.



PERTINENT LABORATORY DATA:  Chest x-ray was done this morning and reviewed.

There is slightly less infiltrate noted at the right lower lobe.



IMPRESSION:

1.  Acute hypoxemic respiratory failure.

2.  Right lower lobe pneumonia.

3.  Advanced lung cancer.

4.  Advanced chronic obstructive pulmonary disease.

5.  Mild bronchospasm.



PLAN:  The patient appears comfortable this morning.  He is not short of

breath at rest.  He does state to feeling much better overall.  I did

discuss the case with the night nurse at length.  The night nurse stated

that the patient had a good night.



I did review the chest x-ray as above.  The chest x-ray does show some

improvement - with a decrease in the right lower lobe infiltrate.  On

physical exam, there is certainly less bronchospasm noted.  In addition,

the alveolar-arterial gradient is also less.  I will continue with the

current nebulizer treatments and intravenous steroids for now.  The patient

remains on antibiotic therapy - as per Infectious Disease.  Input by Dr. Cuellar is noted.  There has now been resolution of the leukocytosis.



Clinical status of the patient is certainly improved - compared to

yesterday.  However, again, unfortunately, the future status/prognosis for

this patient remains poor.  All are aware.  I will discuss the above with

the entire ICU team in the next few moments.  I will also discuss the above

with the attending physician.







__________________________________________

Bennie Shepherd MD



DD:  10/09/2018 7:20:32

DT:  10/09/2018 7:21:44

[facility] # [id number]

HEMA

## 2018-10-09 NOTE — RAD
Date of service: 



10/09/2018



HISTORY:

 follow up 



COMPARISON:

10/08/2018 



FINDINGS:



LUNGS:

There is redemonstration of a large lobular mass in the right lower 

lobe.  Again seen is complete opacification of the left hemithorax 

with shift of trachea and mediastinum to the left.  Are diffuse 

increased interstitial markings in the right lung. 



PLEURA:

Small right pleural effusion, no pneumothorax apparent.



CARDIOVASCULAR:

Normal.



OSSEOUS STRUCTURES:

No significant abnormalities.



VISUALIZED UPPER ABDOMEN:

Normal.



OTHER FINDINGS:

None.



IMPRESSION:

Little interval change in large lobular mass in the right lower lobe. 

 Diffuse interstitial thickening in the right lung could be related 

to interstitial edema or pneumonitis however lymphangitis 

carcinomatosis is also a consideration. 



Near complete opacification of the left hemithorax with shift of 

trachea and mediastinum to the left related to pneumonectomy.

## 2018-10-09 NOTE — PN
DATE:  10/09/2018



SUBJECTIVE:  The patient is seen and examined at bedside.  He is

comfortable.  He talks full sentences.  He is not in respiratory or

otherwise distress.



PHYSICAL EXAMINATION:

VITAL SIGNS:  He requires 60% FIO2 on high-flow with flow of 50 L per

minute.  His oxygen saturation is 91 and 92.  Respiratory rate 20-25, heart

rate 113.

ENT:  Head and neck atraumatic.

LUNGS:  Decreased breath sounds on the left side and few crackles on the

right side.

HEART:  Regular rate and rhythm.  S1, S2 distant.

ABDOMEN:  Soft, nontender and nondistended.

MUSCULOSKELETAL:  No C/C/E.

NEURO:  The patient moves all extremities spontaneously.

SKIN:  Moist.

PSYCH:  The patient is alert, awake and oriented.



LABORATORY DATA:  Sodium 143, potassium 4.9, chloride 110, carbon dioxide

29, BUN 21, creatinine 0.9, glucose 227, AST 27, ALT 27, total bilirubin

0.5.  WBC 9.4, hemoglobin 14, platelet count 193.



MEDICATIONS:  Tylenol p.r.n., DuoNeb every 6 hours, azithromycin,

meropenem, Solu-Medrol 40 mg IV every 8, Protonix, Bactrim.



ASSESSMENT AND PLAN:  This is a 68-year-old gentleman with underline human

immunodeficiency virus diagnosis and unknown CD-4 count and viral load, who

presented with what appears to be community-acquired pneumonia.  He was

started on meropenem and Zithromax.  Possibility of Pneumocystis carinii

pneumonia could not be rule out and Bactrim and steroids  were initiated as 
well. 

Bronchoscopy would be too risky as the patient requires high FIO2 to

maintain gas exchange parameters.  Blood, urine culture were sent.  Urine

for Legionella and Streptococcal antigen ordered.  Procalcitonin is

pending.  Infectious Disease Service is on board.  We will continue to

target euvolemia, euglycemia, normothermia and oxygen saturation more than

90%.  We will continue with deep venous thrombosis, gastrointestinal

prophylaxis.



Addendum: fi02 down 60% on vapotherm-->02sat 98-99 percent



ccm time 40 min





__________________________________________

Iam Martinez MD





DD:  10/09/2018 13:13:30

DT:  10/09/2018 13:15:39

[facility] # [id number]



MTDALY

## 2018-10-09 NOTE — CP.CCUPN
<Bhumika Mitchell - Last Filed: 10/09/18 20:37>





CCU Subjective





- Physician Review


Subjective (Free Text): 


Bhumika Mitchell, PGY-1, CCU Progress for Dr. Martinez





Patient seen and examined at bedside. Patient had no overnight events. Patient 

reports improvement in shortness of breath. Patient denies headache, dizziness, 

chest pain, nausea, vomiting, constipation, diarrhea, dysuria, hematuria.








CCU Objective





- Vital Signs / Intake & Output


Vital Signs (Last 4 hours): 


Vital Signs











  Pulse Resp BP Pulse Ox


 


 10/09/18 19:50  99 H  35 H   95


 


 10/09/18 19:42  97 H  32 H  149/100 H  95


 


 10/09/18 19:40  92 H  32 H   95


 


 10/09/18 19:30  96 H  34 H   92 L


 


 10/09/18 19:20  95 H  23   96


 


 10/09/18 19:10  120 H  54 H   86 L


 


 10/09/18 19:00  99 H  32 H   96


 


 10/09/18 18:50  91 H  35 H   94 L


 


 10/09/18 18:41  96 H   143/85  94 L


 


 10/09/18 18:40  106 H  37 H   93 L


 


 10/09/18 18:30  95 H  21   95


 


 10/09/18 18:20  99 H  30 H   94 L


 


 10/09/18 18:10  93 H  21   96


 


 10/09/18 18:00  99 H  25 H   96


 


 10/09/18 17:50  103 H  26 H   96


 


 10/09/18 17:41  105 H  35 H  154/93 H  96


 


 10/09/18 17:40  107 H  33 H   97


 


 10/09/18 17:30  93 H    95


 


 10/09/18 17:20  106 H    91 L


 


 10/09/18 17:10  109 H  35 H   96


 


 10/09/18 17:00  99 H  35 H   95


 


 10/09/18 16:50  100 H  32 H   96


 


 10/09/18 16:41  101 H   163/93 H  95


 


 10/09/18 16:40  101 H  17   95











Intake and Output (Last 8hrs): 


                                 Intake & Output











 10/09/18 10/09/18 10/09/18





 06:59 14:59 22:59


 


Intake Total 1300  


 


Output Total 700  


 


Balance 600  


 


Intake:   


 


  IV 1200  


 


    Right Forearm 1200  


 


  Oral 100  


 


Output:   


 


  Urine 700  


 


    Urine, Voided 700  


 


Other:   


 


  # Voids   


 


    Urine, Voided 4  














- Physical Exam


Head: Positive for: Atraumatic


Pupils: Positive for: PERRL


Extroacular Muscles: Positive for: EOMI


Conjunctiva: Positive for: Normal


Mouth: Positive for: Moist Mucous Membranes


Neck: Positive for: Normal Range of Motion.  Negative for: Meningeal Signs, 

MIDLINE TENDERNESS


Respiratory/Chest: Positive for: Other (On high flow oxygen at 60%, No LS on L 

side, R side w/ out wheezes or rales or crackles or rhonchi)


Cardiovascular: Positive for: Tachycardic


Abdomen: Positive for: Normal Bowel Sounds.  Negative for: Tenderness, 

Distention


Upper Extremity: Positive for: Normal Inspection.  Negative for: Cyanosis, Edema


Lower Extremity: Positive for: Normal Inspection.  Negative for: Edema


Neurological: Positive for: GCS=15


Skin: Positive for: Warm, Dry.  Negative for: Rashes


Psychiatric: Positive for: Alert, Oriented x 3





- Medications


Active Medications: 


Active Medications











Generic Name Dose Route Start Last Admin





  Trade Name Freq  PRN Reason Stop Dose Admin


 


Acetaminophen  650 mg  10/08/18 17:35  10/08/18 17:42





  Tylenol 325mg Tab  PO   650 mg





  Q6H PRN   Administration





  Fever >100.4 F   





     





     





     


 


Albuterol/Ipratropium  3 ml  10/08/18 14:00  10/09/18 13:15





  Duoneb 3 Mg/0.5 Mg (3 Ml) Ud  IH   3 ml





  [id number] ALBINA   Administration





     





     





     





     


 


Azithromycin  500 mg  10/08/18 10:45  10/09/18 11:50





  Zithromax  PO   500 mg





  DAILY ALBINA   Administration





     





     





  Protocol   





     


 


Heparin Sodium (Porcine)  5,000 units  10/08/18 22:00  10/09/18 13:27





  Heparin  SC   5,000 units





  Q8 ALBINA   Administration





     





     





  Protocol   





     


 


Sodium Chloride  1,000 mls @ 100 mls/hr  10/08/18 08:15  10/09/18 11:53





  Sodium Chloride 0.9%  IV   100 mls/hr





  .Q10H ALBINA   Administration





     





     





     





     


 


Meropenem  1 gm in 50 mls @ 100 mls/hr  10/08/18 14:00  10/09/18 13:27





  Merrem Iv 1 Gm Premix  IVPB   100 mls/hr





  Q8 ALBINA   Administration





     





     





  Protocol   





     


 


Trimethoprim/Sulfamethoxazole  500 mls @ 250 mls/hr  10/09/18 14:00  10/09/18 

15:38





  370 mg/ Dextrose  IVPB   250 mls/hr





  Q8 ALBINA   Administration





     





     





     





     


 


Insulin Human Lispro  0 units  10/09/18 11:30  10/09/18 11:36





  Humalog Low  SC   Not Given





  [facility]   





     





     





  Protocol   





     


 


Methylprednisolone  40 mg  10/08/18 13:45  10/09/18 13:28





  Solu-Medrol  IVP   40 mg





  Q8H ALBINA   Administration





     





     





     





     


 


Pantoprazole Sodium  40 mg  10/10/18 07:30  





  Protonix Ec Tab  PO   





  ACB ALBINA   





     





     





     





     














- Patient Studies


Lab Studies: 


                              Microbiology Studies











 10/08/18 12:40 MRSA Culture (Admit) - Final





 Naris    MRSA NOT DETECTED


 


 10/08/18 07:35 Blood Culture - Preliminary





 Blood-Venous    NO GROWTH AFTER 24 HOURS


 


 10/08/18 07:11 Blood Culture - Preliminary





 Blood-Venous    NO GROWTH AFTER 24 HOURS








                                   Lab Studies











  10/09/18 10/09/18 10/09/18 Range/Units





  14:30 09:37 06:30 


 


WBC     (4.5-11.0)  10^3/ul


 


RBC     (3.5-6.1)  10^6/uL


 


Hgb     (14.0-18.0)  g/dL


 


Hct     (42.0-52.0)  %


 


MCV     (80.0-105.0)  fl


 


MCH     (25.0-35.0)  pg


 


MCHC     (31.0-37.0)  g/dl


 


RDW     (11.5-14.5)  %


 


Plt Count     (120.0-450.0)  10^3/uL


 


MPV     (7.0-11.0)  fl


 


Gran %     (50.0-68.0)  %


 


Lymph % (Auto)     (22.0-35.0)  %


 


Mono % (Auto)     (1.0-6.0)  %


 


Eos % (Auto)     (1.5-5.0)  %


 


Baso % (Auto)     (0.0-3.0)  %


 


Gran #     (1.4-6.5)  


 


Lymph # (Auto)     (1.2-3.4)  


 


Mono # (Auto)     (0.1-0.6)  


 


Eos # (Auto)     (0.0-0.7)  


 


Baso # (Auto)     (0.0-2.0)  K/mm3


 


Sodium     (132-148)  mmol/L


 


Potassium     (3.6-5.0)  mmol/L


 


Chloride     ()  mmol/L


 


Carbon Dioxide     (21-33)  mmol/L


 


Anion Gap     (10-20)  


 


BUN     (7-21)  mg/dL


 


Creatinine     (0.8-1.5)  mg/dl


 


Est GFR (African Amer)     


 


Est GFR (Non-Af Amer)     


 


POC Glucose (mg/dL)   227 H   ()  mg/dL


 


Random Glucose     ()  mg/dL


 


Calcium     (8.4-10.5)  mg/dL


 


Phosphorus     (2.5-4.5)  mg/dL


 


Magnesium     (1.7-2.2)  mg/dL


 


Total Bilirubin     (0.2-1.3)  mg/dL


 


AST     (17-59)  U/L


 


ALT     (7-56)  U/L


 


Alkaline Phosphatase     ()  U/L


 


Lactate Dehydrogenase    674  (333-699)  U/L


 


Total Protein     (5.8-8.3)  g/dL


 


Albumin     (3.0-4.8)  g/dL


 


Globulin     gm/dL


 


Albumin/Globulin Ratio     (1.1-1.8)  


 


Procalcitonin     (0.19-0.49)  NG/ML


 


Ur L.pneumophila Ag  Negative    (NEGATIVE)  














  10/09/18 10/09/18 10/08/18 Range/Units





  05:40 05:40 08:30 


 


WBC   9.4  D   (4.5-11.0)  10^3/ul


 


RBC   6.02   (3.5-6.1)  10^6/uL


 


Hgb   14.0  D   (14.0-18.0)  g/dL


 


Hct   45.1   (42.0-52.0)  %


 


MCV   74.9 L   (80.0-105.0)  fl


 


MCH   23.3 L   (25.0-35.0)  pg


 


MCHC   31.0   (31.0-37.0)  g/dl


 


RDW   14.3   (11.5-14.5)  %


 


Plt Count   193   (120.0-450.0)  10^3/uL


 


MPV   10.9   (7.0-11.0)  fl


 


Gran %   79.5 H   (50.0-68.0)  %


 


Lymph % (Auto)   13.6 L   (22.0-35.0)  %


 


Mono % (Auto)   6.7 H   (1.0-6.0)  %


 


Eos % (Auto)   0.1 L   (1.5-5.0)  %


 


Baso % (Auto)   0.1   (0.0-3.0)  %


 


Gran #   7.47 H   (1.4-6.5)  


 


Lymph # (Auto)   1.3   (1.2-3.4)  


 


Mono # (Auto)   0.6   (0.1-0.6)  


 


Eos # (Auto)   0.0   (0.0-0.7)  


 


Baso # (Auto)   0.01   (0.0-2.0)  K/mm3


 


Sodium  143    (132-148)  mmol/L


 


Potassium  4.9    (3.6-5.0)  mmol/L


 


Chloride  110 H    ()  mmol/L


 


Carbon Dioxide  29    (21-33)  mmol/L


 


Anion Gap  9 L    (10-20)  


 


BUN  21    (7-21)  mg/dL


 


Creatinine  0.9    (0.8-1.5)  mg/dl


 


Est GFR (African Amer)  > 60    


 


Est GFR (Non-Af Amer)  > 60    


 


POC Glucose (mg/dL)     ()  mg/dL


 


Random Glucose  148 H    ()  mg/dL


 


Calcium  8.1 L    (8.4-10.5)  mg/dL


 


Phosphorus  2.5    (2.5-4.5)  mg/dL


 


Magnesium  1.6 L    (1.7-2.2)  mg/dL


 


Total Bilirubin  0.5    (0.2-1.3)  mg/dL


 


AST  27    (17-59)  U/L


 


ALT  27    (7-56)  U/L


 


Alkaline Phosphatase  74    ()  U/L


 


Lactate Dehydrogenase     (333-699)  U/L


 


Total Protein  6.7    (5.8-8.3)  g/dL


 


Albumin  3.2    (3.0-4.8)  g/dL


 


Globulin  3.6    gm/dL


 


Albumin/Globulin Ratio  0.9 L    (1.1-1.8)  


 


Procalcitonin    < 0.05 L  (0.19-0.49)  NG/ML


 


Ur L.pneumophila Ag     (NEGATIVE)  








                         Laboratory Results - last 24 hr











  10/08/18 10/09/18 10/09/18





  08:30 05:40 05:40


 


WBC   9.4  D 


 


RBC   6.02 


 


Hgb   14.0  D 


 


Hct   45.1 


 


MCV   74.9 L 


 


MCH   23.3 L 


 


MCHC   31.0 


 


RDW   14.3 


 


Plt Count   193 


 


MPV   10.9 


 


Gran %   79.5 H 


 


Lymph % (Auto)   13.6 L 


 


Mono % (Auto)   6.7 H 


 


Eos % (Auto)   0.1 L 


 


Baso % (Auto)   0.1 


 


Gran #   7.47 H 


 


Lymph # (Auto)   1.3 


 


Mono # (Auto)   0.6 


 


Eos # (Auto)   0.0 


 


Baso # (Auto)   0.01 


 


Sodium    143


 


Potassium    4.9


 


Chloride    110 H


 


Carbon Dioxide    29


 


Anion Gap    9 L


 


BUN    21


 


Creatinine    0.9


 


Est GFR ( Amer)    > 60


 


Est GFR (Non-Af Amer)    > 60


 


POC Glucose (mg/dL)   


 


Random Glucose    148 H


 


Calcium    8.1 L


 


Phosphorus    2.5


 


Magnesium    1.6 L


 


Total Bilirubin    0.5


 


AST    27


 


ALT    27


 


Alkaline Phosphatase    74


 


Lactate Dehydrogenase   


 


Total Protein    6.7


 


Albumin    3.2


 


Globulin    3.6


 


Albumin/Globulin Ratio    0.9 L


 


Procalcitonin  < 0.05 L  


 


Ur L.pneumophila Ag   














  10/09/18 10/09/18 10/09/18





  06:30 09:37 14:30


 


WBC   


 


RBC   


 


Hgb   


 


Hct   


 


MCV   


 


MCH   


 


MCHC   


 


RDW   


 


Plt Count   


 


MPV   


 


Gran %   


 


Lymph % (Auto)   


 


Mono % (Auto)   


 


Eos % (Auto)   


 


Baso % (Auto)   


 


Gran #   


 


Lymph # (Auto)   


 


Mono # (Auto)   


 


Eos # (Auto)   


 


Baso # (Auto)   


 


Sodium   


 


Potassium   


 


Chloride   


 


Carbon Dioxide   


 


Anion Gap   


 


BUN   


 


Creatinine   


 


Est GFR ( Amer)   


 


Est GFR (Non-Af Amer)   


 


POC Glucose (mg/dL)   227 H 


 


Random Glucose   


 


Calcium   


 


Phosphorus   


 


Magnesium   


 


Total Bilirubin   


 


AST   


 


ALT   


 


Alkaline Phosphatase   


 


Lactate Dehydrogenase  674  


 


Total Protein   


 


Albumin   


 


Globulin   


 


Albumin/Globulin Ratio   


 


Procalcitonin   


 


Ur L.pneumophila Ag    Negative














Review of Systems





- Constitutional


Constitutional: absent: Fever, Chills, Sweats





- EENT


Eyes: absent: Blurred Vision


Ears: absent: Decreased Hearing





- Cardiovascular


Cardiovascular: absent: Chest Pain





- Respiratory


Respiratory: Dyspnea





- Gastrointestinal


Gastrointestinal: absent: Constipation, Diarrhea, Nausea, Vomiting





- Genitourinary


Genitourinary: absent: Change in Urinary Stream, Dysuria, Hematuria





- Musculoskeletal


Musculoskeletal: absent: Arthralgias





- Neurological


Neurological: absent: Confusion, Loss of Vision, Tingling, Tremor





Critical Care Progress Note





- Ventilator Checklist


Head of Bed 30 Degrees: Yes


PUD Prophalyxis: Yes


DVT Prophylaxis: Yes





- Nutrition


Nutrition: 


                                    Nutrition











 Category Date Time Status


 


 Heart Healthy Diet [DIET] Diets  10/08/18 Dinner Active














Assessment/Plan





- Assessment and Plan (Free Text)


Assessment: 


68 year old male with past medical history of metastatic lung cancer (s/p L lung

 resection, now with newly diagnosed 9 cm in right lung), HTN, HIV presents to 

ED with SOB for 2 hours. Patient was told by PCP, Dr. Lawrence, that it was a  

recurrence of cancer on the right but cannot recall PNA.





Plan: 


Neuro:  





-AAOx3, no FND, moving extremities past midline.  


-Monitor neuro status.   


-Reorient patient as necessary.  





Cardio:  





-RRR, hypertensive at 149/100, no signs of HD compromise  


-Maintain MAP>65.   


-Monitor for S/S, HD compromise.  





Pulm:  





-Patient was in respiratory distress on high flow oxygen at 60%


-Patient is stating well at 95%


-Maintain O2 saturation>95%.   


-CXR:  Little interval change in large lobular mass in the right lower lobe. 

Diffuse interstitial thickening in the right lung could be related to 

interstitial edema or pneumonitis however lymphangitis carcinomatosis is also a 

consideration. Near complete opacification of the left hemithorax with shift of 

trachea and mediastinum to the left related to pneumonectomy


-Solumedrol 40 mg Q8


-Elevate bed to 30 degrees  





GI:  





-Tolerating diet well.  


-Protonix 40 mg daily





/Nephro:  





-BUN/Cr stable at 21/0.9


-Good urine output: 1250


-Continue monitoring.  


-Mildly hypocalcemia at 8.1


-Replete electrolytes as needed.  


-Maintain euvolemia.  





Endocrinology:  





-Random glucose: 148


-Maintain euglycemia.  





Heme/Onc:  





-H/H stable at 14/45.1


-Leukocytosis present at 9.4


-No signs of HD compromise.  


-Continue monitoring H/H  





ID:  





-Afebrile, leukocytosis present at 9.4


-Patient has HIV with unknown CD4 count and viral load, who presented with what 

appears to be CAP.


-Patient started on meropenem, zithromax, bactrim, steroids  


-No growth on blood culture and MRSA


-Monitor for signs and symptoms of infection.  





DVT prophylaxis: heparin subq 5000 U Q8


GI prophylaxis:  protonix 40 mg daily





Patient seen and examined with Dr. Martinez.





 








- Date & Time


Date: 10/09/18


Time: 20:42





<Iam Martinez - Last Filed: 10/10/18 07:21>





CCU Objective





- Vital Signs / Intake & Output


Vital Signs (Last 4 hours): 


Vital Signs











  Temp Pulse Resp BP Pulse Ox


 


 10/10/18 05:59  98.0 F  98 H  21  149/98 H  98


 


 10/10/18 05:53   88   











Intake and Output (Last 8hrs): 


                                 Intake & Output











 10/09/18 10/10/18 10/10/18





 22:59 06:59 14:59


 


Intake Total  0 


 


Output Total  350 


 


Balance  -350 


 


Intake:   


 


  Oral  0 


 


Output:   


 


  Urine  350 


 


    Urine, Voided  350 


 


  Stool  0 














- Medications


Active Medications: 


Active Medications











Generic Name Dose Route Start Last Admin





  Trade Name Freq  PRN Reason Stop Dose Admin


 


Acetaminophen  650 mg  10/08/18 17:35  10/08/18 17:42





  Tylenol 325mg Tab  PO   650 mg





  Q6H PRN   Administration





  Fever >100.4 F   





     





     





     


 


Albuterol/Ipratropium  3 ml  10/08/18 14:00  10/10/18 02:40





  Duoneb 3 Mg/0.5 Mg (3 Ml) Ud  IH   Not Given





  [id number] ALBINA   





     





     





     





     


 


Azithromycin  500 mg  10/08/18 10:45  10/09/18 11:50





  Zithromax  PO   500 mg





  DAILY ALBINA   Administration





     





     





  Protocol   





     


 


Diazepam  5 mg  10/09/18 22:00  10/09/18 22:16





  Valium  PO   5 mg





  HS ALBINA   Administration





     





     





  Protocol   





     


 


Efavirenz/Emtricitabine/Tenofovir  1 tab  10/09/18 22:00  10/09/18 22:21





  Atripla 600 Mg-200 Mg-300 Mg  PO   1 tab





  HS ALBINA   Administration





     





     





  Protocol   





     


 


Heparin Sodium (Porcine)  5,000 units  10/08/18 22:00  10/10/18 05:26





  Heparin  SC   5,000 units





  Q8 ALBINA   Administration





     





     





  Protocol   





     


 


Sodium Chloride  1,000 mls @ 100 mls/hr  10/08/18 08:15  10/10/18 06:32





  Sodium Chloride 0.9%  IV   100 mls/hr





  .Q10H ALBINA   Administration





     





     





     





     


 


Meropenem  1 gm in 50 mls @ 100 mls/hr  10/08/18 14:00  10/10/18 05:26





  Merrem Iv 1 Gm Premix  IVPB   100 mls/hr





  Q8 ALBINA   Administration





     





     





  Protocol   





     


 


Trimethoprim/Sulfamethoxazole  500 mls @ 250 mls/hr  10/09/18 14:00  10/10/18 

06:31





  370 mg/ Dextrose  IVPB   250 mls/hr





  Q8 ALBINA   Administration





     





     





     





     


 


Insulin Human Lispro  0 units  10/09/18 11:30  10/09/18 22:30





  Humalog Low  SC   Not Given





  [facility]   





     





     





  Protocol   





     


 


Methylprednisolone  40 mg  10/08/18 13:45  10/10/18 05:27





  Solu-Medrol  IVP   40 mg





  Q8H ALBINA   Administration





     





     





     





     


 


Pantoprazole Sodium  40 mg  10/10/18 07:30  





  Protonix Ec Tab  PO   





  ACB ALBINA   





     





     





     





     














- Patient Studies


Lab Studies: 


                              Microbiology Studies











 10/08/18 12:40 MRSA Culture (Admit) - Final





 Naris    MRSA NOT DETECTED


 


 10/08/18 07:35 Blood Culture - Preliminary





 Blood-Venous    NO GROWTH AFTER 24 HOURS


 


 10/08/18 07:11 Blood Culture - Preliminary





 Blood-Venous    NO GROWTH AFTER 24 HOURS








                                   Lab Studies











  10/09/18 10/09/18 10/09/18 Range/Units





  21:46 14:30 09:37 


 


Sodium     (132-148)  mmol/L


 


Potassium     (3.6-5.0)  mmol/L


 


Chloride     ()  mmol/L


 


Carbon Dioxide     (21-33)  mmol/L


 


Anion Gap     (10-20)  


 


BUN     (7-21)  mg/dL


 


Creatinine     (0.8-1.5)  mg/dl


 


Est GFR (African Amer)     


 


Est GFR (Non-Af Amer)     


 


POC Glucose (mg/dL)  105   227 H  ()  mg/dL


 


Random Glucose     ()  mg/dL


 


Calcium     (8.4-10.5)  mg/dL


 


Phosphorus     (2.5-4.5)  mg/dL


 


Magnesium     (1.7-2.2)  mg/dL


 


Total Bilirubin     (0.2-1.3)  mg/dL


 


AST     (17-59)  U/L


 


ALT     (7-56)  U/L


 


Alkaline Phosphatase     ()  U/L


 


Lactate Dehydrogenase     (333-699)  U/L


 


Total Protein     (5.8-8.3)  g/dL


 


Albumin     (3.0-4.8)  g/dL


 


Globulin     gm/dL


 


Albumin/Globulin Ratio     (1.1-1.8)  


 


Procalcitonin     (0.19-0.49)  NG/ML


 


Ur L.pneumophila Ag   Negative   (NEGATIVE)  














  10/09/18 10/09/18 10/08/18 Range/Units





  06:30 05:40 08:30 


 


Sodium   143   (132-148)  mmol/L


 


Potassium   4.9   (3.6-5.0)  mmol/L


 


Chloride   110 H   ()  mmol/L


 


Carbon Dioxide   29   (21-33)  mmol/L


 


Anion Gap   9 L   (10-20)  


 


BUN   21   (7-21)  mg/dL


 


Creatinine   0.9   (0.8-1.5)  mg/dl


 


Est GFR (African Amer)   > 60   


 


Est GFR (Non-Af Amer)   > 60   


 


POC Glucose (mg/dL)     ()  mg/dL


 


Random Glucose   148 H   ()  mg/dL


 


Calcium   8.1 L   (8.4-10.5)  mg/dL


 


Phosphorus   2.5   (2.5-4.5)  mg/dL


 


Magnesium   1.6 L   (1.7-2.2)  mg/dL


 


Total Bilirubin   0.5   (0.2-1.3)  mg/dL


 


AST   27   (17-59)  U/L


 


ALT   27   (7-56)  U/L


 


Alkaline Phosphatase   74   ()  U/L


 


Lactate Dehydrogenase  674    (333-699)  U/L


 


Total Protein   6.7   (5.8-8.3)  g/dL


 


Albumin   3.2   (3.0-4.8)  g/dL


 


Globulin   3.6   gm/dL


 


Albumin/Globulin Ratio   0.9 L   (1.1-1.8)  


 


Procalcitonin    < 0.05 L  (0.19-0.49)  NG/ML


 


Ur L.pneumophila Ag     (NEGATIVE)  








                         Laboratory Results - last 24 hr











  10/08/18 10/09/18 10/09/18





  08:30 05:40 06:30


 


Sodium   143 


 


Potassium   4.9 


 


Chloride   110 H 


 


Carbon Dioxide   29 


 


Anion Gap   9 L 


 


BUN   21 


 


Creatinine   0.9 


 


Est GFR ( Amer)   > 60 


 


Est GFR (Non-Af Amer)   > 60 


 


POC Glucose (mg/dL)   


 


Random Glucose   148 H 


 


Calcium   8.1 L 


 


Phosphorus   2.5 


 


Magnesium   1.6 L 


 


Total Bilirubin   0.5 


 


AST   27 


 


ALT   27 


 


Alkaline Phosphatase   74 


 


Lactate Dehydrogenase    674


 


Total Protein   6.7 


 


Albumin   3.2 


 


Globulin   3.6 


 


Albumin/Globulin Ratio   0.9 L 


 


Procalcitonin  < 0.05 L  


 


Ur L.pneumophila Ag   














  10/09/18 10/09/18 10/09/18





  09:37 14:30 21:46


 


Sodium   


 


Potassium   


 


Chloride   


 


Carbon Dioxide   


 


Anion Gap   


 


BUN   


 


Creatinine   


 


Est GFR ( Amer)   


 


Est GFR (Non-Af Amer)   


 


POC Glucose (mg/dL)  227 H   105


 


Random Glucose   


 


Calcium   


 


Phosphorus   


 


Magnesium   


 


Total Bilirubin   


 


AST   


 


ALT   


 


Alkaline Phosphatase   


 


Lactate Dehydrogenase   


 


Total Protein   


 


Albumin   


 


Globulin   


 


Albumin/Globulin Ratio   


 


Procalcitonin   


 


Ur L.pneumophila Ag   Negative 














Critical Care Progress Note





- Nutrition


Nutrition: 


                                    Nutrition











 Category Date Time Status


 


 Heart Healthy Diet [DIET] Diets  10/08/18 Dinner Active














Attending/Attestation





- Attestation


I have personally seen and examined this patient.: Yes


I have fully participated in the care of the patient.: Yes


I have reviewed all pertinent clinical information: Yes


Notes (Text): 





10/10/18 07:21


please see Dr. Martinez note
details…

## 2018-10-09 NOTE — PN
DATE:  10/09/2018



SUBJECTIVE:  A 68-year-old white male seen in the ICU bed 5.  The patient

has a history of a right lobectomy and recent recurrence of left lung

cancer with mediastinal spread and precarinal and pretracheal spread and

possible esophageal mass.  The patient was admitted, on IV antibiotics.  He

was seen in consultation by Dr. Shepherd and also by Dr. Reynoso, his

oncologist.  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

possible metastatic, possible new primary.  The patient was evaluated by

Dr. Reynoso for chemotherapy and has not started at this point.  He was

admitted with severe shortness of breath, wheezing, rhonchi, and

exacerbation of underlying COPD.  The patient had a good night; however,

developed some wheezing this morning.



His vital signs are stable.  He is on IV antibiotics, bronchodilators and

steroids.  He is afebrile.  Heart rate jumps anywhere from 80 to 115

depending on his shortness of breath.



LABORATORY DATA:  His H and H is stable, hemoglobin is 14, his white count

is 9.4.  Platelets are normal at 193.



PLAN:  Continue bronchodilator, steroids, Oncology consultation and BiPAP

for control of hypoxia.







__________________________________________

Rodney Lawrence MD





DD:  10/09/2018 8:36:54

DT:  10/09/2018 9:45:35

Job # [id number]

## 2018-10-09 NOTE — CP.PCM.CON
History of Present Illness





- History of Present Illness


History of Present Illness: 


68 year old male with PMH of metastatic lung cancer (S/P left lung resection, 

but now has a newly discovered right lung mass), HTN, questionable HIV, came in 

to [facility] [place] complaining of worsening shortness of breath for the past 2-3 days asso

ciated with cough productive of whitish phlegm, with dyspnea on exertion. He has

been having cough and SOB for the past 3 weeks and was being given Levaquin but 

he continued to have his symptoms and his Levaquin was stopped. He denies fever 

or chills, no nausea or vomiting, no abdominal pain, feels weak and tired, 

denies headache or dizziness, no chest pain, at times gasps for air, no sore 

throat, no diarrhea. CXR is showing right lower lobe infiltrate but also shows 

interstitial infiltrate. Infectious Diseases consult is requested to further 

evaluate and manage.





Review of Systems





- Review of Systems


All systems: reviewed and no additional remarkable complaints except (as per 

HPI)





Past Patient History





- Infectious Disease


Hx of Infectious Diseases: None





- Past Social History


Smoking Status: Unknown If Ever Smoked





- CARDIAC


Hx Pacemaker: No





- PULMONARY


Hx Lung Cancer: Yes


Other/Comment: Left pneumonectomy.  Mass noted to the Rt. lung





- HEMATOLOGICAL/ONCOLOGICAL


Hx Blood Transfusions: No





- MUSCULOSKELETAL/RHEUMATOLOGICAL


Hx Musculoskeletal Disorders: No





- PSYCHIATRIC


Hx Emotional Abuse: No


Hx Physical Abuse: No





- SURGICAL HISTORY


Hx Surgeries: Yes (LEFT PNEUMONECTOMY, BILATERAL MENISCUS SURGERY)





- ANESTHESIA


Hx Anesthesia Reactions: No





Meds


Allergies/Adverse Reactions: 


                                    Allergies











Allergy/AdvReac Type Severity Reaction Status Date / Time


 


No Known Allergies Allergy   Verified 10/08/18 07:29














- Medications


Medications: 


                               Current Medications





Azithromycin (Zithromax)  500 mg PO DAILY ALBINA; Protocol


Sodium Chloride (Sodium Chloride 0.9%)  1,000 mls @ 100 mls/hr IV .Q10H ALBINA


   Last Admin: 10/08/18 08:14 Dose:  100 mls/hr


Vancomycin HCl (Vancomycin 1gm)  1 gm in 250 mls @ 167 mls/hr IVPB STAT STA; Pro

tocol


   Stop: 10/08/18 12:02


Meropenem (Merrem Iv 1 Gm Premix)  50 mls @ 100 mls/hr IVPB Q8 ALBINA; Protocol











Physical Exam





- Constitutional


Appears: Chronically Ill





- Head Exam


Head Exam: NORMAL INSPECTION





- Respiratory Exam


Respiratory Exam: Decreased Breath Sounds





- Cardiovascular Exam


Cardiovascular Exam: +S1, +S2





- GI/Abdominal Exam


GI & Abdominal Exam: Soft.  absent: Tenderness





Results





- Vital Signs


Recent Vital Signs: 


                                Last Vital Signs











Temp  99.0 F   10/08/18 07:19


 


Pulse  106 H  10/08/18 10:32


 


Resp  22   10/08/18 10:32


 


BP  120/90   10/08/18 10:32


 


Pulse Ox  99   10/08/18 10:32














- Labs


Result Diagrams: 


                                 10/09/18 05:40





                                 10/09/18 05:40


Labs: 


                         Laboratory Results - last 24 hr











  10/08/18 10/08/18 10/08/18





  07:11 07:15 07:15


 


WBC   14.0 H D 


 


RBC   7.07 H 


 


Hgb   16.7 


 


Hct   53.1 H 


 


MCV   75.1 L 


 


MCH   23.6 L 


 


MCHC   31.5 


 


RDW   14.5 


 


Plt Count   196 


 


MPV   11.4 H 


 


Gran %   80.8 H 


 


Lymph % (Auto)   11.9 L 


 


Mono % (Auto)   5.0 


 


Eos % (Auto)   2.1 


 


Baso % (Auto)   0.2 


 


Gran #   11.31 H 


 


Lymph # (Auto)   1.7 


 


Mono # (Auto)   0.7 H 


 


Eos # (Auto)   0.3 


 


Baso # (Auto)   0.03 


 


pCO2   


 


pO2  32  


 


HCO3   


 


ABG pH   


 


ABG Total CO2   


 


ABG O2 Saturation   


 


ABG Base Excess   


 


ABG Potassium   


 


VBG pH  7.24 L  


 


VBG pCO2  66.0 H*  


 


VBG HCO3  28.3 H  


 


VBG Total CO2  30.3 H  


 


VBG O2 Sat (Calc)  59.6  


 


VBG Base Excess  -0.7 L  


 


VBG Potassium  4.4  


 


Sodium  141.0   144


 


Chloride  106.0   106


 


Glucose  257 H  


 


Lactate  2.3 H  


 


FiO2  21.0  


 


Potassium    4.1


 


Carbon Dioxide    28


 


Anion Gap    14


 


BUN    25 H


 


Creatinine    1.0


 


Est GFR ( Amer)    > 60


 


Est GFR (Non-Af Amer)    > 60


 


Random Glucose    253 H


 


Calcium    8.8


 


Total Bilirubin    0.6


 


AST    34


 


ALT    26


 


Alkaline Phosphatase    126  D


 


Troponin I    0.06


 


NT-Pro-B Natriuret Pep    473 H


 


Total Protein    8.4 H


 


Albumin    4.1


 


Globulin    4.4


 


Albumin/Globulin Ratio    0.9 L


 


TSH 3rd Generation   


 


Arterial Blood Potassium   


 


Venous Blood Potassium  4.4  


 


Blood Type   


 


Blood Type Confirm   


 


Antibody Screen   


 


BBK History Checked   














  10/08/18 10/08/18 10/08/18





  07:15 07:15 07:45


 


WBC   


 


RBC   


 


Hgb   


 


Hct   


 


MCV   


 


MCH   


 


MCHC   


 


RDW   


 


Plt Count   


 


MPV   


 


Gran %   


 


Lymph % (Auto)   


 


Mono % (Auto)   


 


Eos % (Auto)   


 


Baso % (Auto)   


 


Gran #   


 


Lymph # (Auto)   


 


Mono # (Auto)   


 


Eos # (Auto)   


 


Baso # (Auto)   


 


pCO2    48 H


 


pO2    72.0 L


 


HCO3    25.3


 


ABG pH    7.33 L


 


ABG Total CO2    26.8


 


ABG O2 Saturation    96.1


 


ABG Base Excess    -1.1


 


ABG Potassium    3.4 L


 


VBG pH   


 


VBG pCO2   


 


VBG HCO3   


 


VBG Total CO2   


 


VBG O2 Sat (Calc)   


 


VBG Base Excess   


 


VBG Potassium   


 


Sodium    141.0


 


Chloride    110.0 H


 


Glucose    205 H


 


Lactate    1.1


 


FiO2    100.0


 


Potassium   


 


Carbon Dioxide   


 


Anion Gap   


 


BUN   


 


Creatinine   


 


Est GFR ( Amer)   


 


Est GFR (Non-Af Amer)   


 


Random Glucose   


 


Calcium   


 


Total Bilirubin   


 


AST   


 


ALT   


 


Alkaline Phosphatase   


 


Troponin I   


 


NT-Pro-B Natriuret Pep   


 


Total Protein   


 


Albumin   


 


Globulin   


 


Albumin/Globulin Ratio   


 


TSH 3rd Generation  2.30  


 


Arterial Blood Potassium    3.4 L


 


Venous Blood Potassium   


 


Blood Type   B POSITIVE 


 


Blood Type Confirm   


 


Antibody Screen   Negative 


 


BBK History Checked   No verified bt 














  10/08/18





  07:45


 


WBC 


 


RBC 


 


Hgb 


 


Hct 


 


MCV 


 


MCH 


 


MCHC 


 


RDW 


 


Plt Count 


 


MPV 


 


Gran % 


 


Lymph % (Auto) 


 


Mono % (Auto) 


 


Eos % (Auto) 


 


Baso % (Auto) 


 


Gran # 


 


Lymph # (Auto) 


 


Mono # (Auto) 


 


Eos # (Auto) 


 


Baso # (Auto) 


 


pCO2 


 


pO2 


 


HCO3 


 


ABG pH 


 


ABG Total CO2 


 


ABG O2 Saturation 


 


ABG Base Excess 


 


ABG Potassium 


 


VBG pH 


 


VBG pCO2 


 


VBG HCO3 


 


VBG Total CO2 


 


VBG O2 Sat (Calc) 


 


VBG Base Excess 


 


VBG Potassium 


 


Sodium 


 


Chloride 


 


Glucose 


 


Lactate 


 


FiO2 


 


Potassium 


 


Carbon Dioxide 


 


Anion Gap 


 


BUN 


 


Creatinine 


 


Est GFR ( Amer) 


 


Est GFR (Non-Af Amer) 


 


Random Glucose 


 


Calcium 


 


Total Bilirubin 


 


AST 


 


ALT 


 


Alkaline Phosphatase 


 


Troponin I 


 


NT-Pro-B Natriuret Pep 


 


Total Protein 


 


Albumin 


 


Globulin 


 


Albumin/Globulin Ratio 


 


TSH 3rd Generation 


 


Arterial Blood Potassium 


 


Venous Blood Potassium 


 


Blood Type 


 


Blood Type Confirm  B POSITIVE


 


Antibody Screen 


 


BBK History Checked 














Assessment & Plan





- Assessment and Plan (Free Text)


Plan: 





Assessment


Severe sepsis with acute hypoxic respiratory failure due to right lower lobe H

CAP, R/O PJP in this patient with possible chronic HIV infection


metastatic lung cancer (S/P left lung resection, but now has a newly discovered 

right lung mass)


HTN





Plan


We have started Vancomycin, Merrem and Zithromax (day 2) but will switch 

Vancomycin to BActrim pending LDH and Fungitell - would be ideal to have patient

undergo CT chest


follow up CD4 count and HIV VL


will monitor clinically

## 2018-10-10 LAB
ALBUMIN SERPL-MCNC: 3.3 G/DL (ref 3–4.8)
ALBUMIN/GLOB SERPL: 0.9 {RATIO} (ref 1.1–1.8)
ALT SERPL-CCNC: 38 U/L (ref 7–56)
ARTERIAL BLOOD GAS O2 SAT: 93.6 % (ref 95–98)
ARTERIAL BLOOD GAS PCO2: 36 MM/HG (ref 35–45)
ARTERIAL BLOOD GAS TCO2: 22.9 MMOL.L (ref 22–28)
AST SERPL-CCNC: 43 U/L (ref 17–59)
BASE EXCESS BLDV CALC-SCNC: -0.8 MMOL/L (ref 0–2)
BASE EXCESS BLDV CALC-SCNC: 0.3 MMOL/L (ref 0–2)
BASOPHILS # BLD AUTO: 0 K/MM3 (ref 0–2)
BASOPHILS NFR BLD: 0 % (ref 0–3)
BUN SERPL-MCNC: 20 MG/DL (ref 7–21)
CALCIUM SERPL-MCNC: 7.9 MG/DL (ref 8.4–10.5)
EOSINOPHIL # BLD: 0 10*3/UL (ref 0–0.7)
EOSINOPHIL NFR BLD: 0 % (ref 1.5–5)
ERYTHROCYTE [DISTWIDTH] IN BLOOD BY AUTOMATED COUNT: 14.3 % (ref 11.5–14.5)
GFR NON-AFRICAN AMERICAN: > 60
GRANULOCYTES # BLD: 8.27 10*3/UL (ref 1.4–6.5)
GRANULOCYTES NFR BLD: 85.1 % (ref 50–68)
HCO3 BLDA-SCNC: 21.8 MMOL/L (ref 21–28)
HGB BLD-MCNC: 14.6 G/DL (ref 14–18)
INHALED O2 CONCENTRATION: 60 %
LYMPHOCYTES # BLD: 1.2 10*3/UL (ref 1.2–3.4)
LYMPHOCYTES NFR BLD AUTO: 11.8 % (ref 22–35)
MCH RBC QN AUTO: 23.2 PG (ref 25–35)
MCHC RBC AUTO-ENTMCNC: 31.7 G/DL (ref 31–37)
MCV RBC AUTO: 73.4 FL (ref 80–105)
MONOCYTES # BLD AUTO: 0.3 10*3/UL (ref 0.1–0.6)
MONOCYTES NFR BLD: 3.1 % (ref 1–6)
PH BLDA: 7.39 [PH] (ref 7.35–7.45)
PH BLDV: 7.31 [PH] (ref 7.32–7.43)
PH BLDV: 7.37 [PH] (ref 7.32–7.43)
PLATELET # BLD: 198 10^3/UL (ref 120–450)
PMV BLD AUTO: 10.8 FL (ref 7–11)
PO2 BLDA: 60 MM/HG (ref 80–100)
RBC # BLD AUTO: 6.28 10^6/UL (ref 3.5–6.1)
VENOUS BLOOD FIO2: 21 %
VENOUS BLOOD FIO2: 21 %
VENOUS BLOOD GAS PCO2: 45 (ref 40–60)
VENOUS BLOOD GAS PCO2: 52 (ref 40–60)
VENOUS BLOOD GAS PO2: 37 MM/HG (ref 30–55)
VENOUS BLOOD GAS PO2: 42 MM/HG (ref 30–55)
WBC # BLD AUTO: 9.7 10^3/UL (ref 4.5–11)

## 2018-10-10 RX ADMIN — MEROPENEM SCH MLS/HR: 1 INJECTION INTRAVENOUS at 05:26

## 2018-10-10 RX ADMIN — IPRATROPIUM BROMIDE AND ALBUTEROL SULFATE SCH ML: .5; 3 SOLUTION RESPIRATORY (INHALATION) at 14:30

## 2018-10-10 RX ADMIN — IPRATROPIUM BROMIDE AND ALBUTEROL SULFATE SCH: .5; 3 SOLUTION RESPIRATORY (INHALATION) at 02:40

## 2018-10-10 RX ADMIN — METHYLPREDNISOLONE SODIUM SUCCINATE SCH MG: 40 INJECTION, POWDER, FOR SOLUTION INTRAMUSCULAR; INTRAVENOUS at 05:27

## 2018-10-10 RX ADMIN — METHYLPREDNISOLONE SODIUM SUCCINATE SCH MG: 40 INJECTION, POWDER, FOR SOLUTION INTRAMUSCULAR; INTRAVENOUS at 22:10

## 2018-10-10 RX ADMIN — INSULIN LISPRO SCH: 100 INJECTION, SOLUTION INTRAVENOUS; SUBCUTANEOUS at 13:09

## 2018-10-10 RX ADMIN — METHYLPREDNISOLONE SODIUM SUCCINATE SCH MG: 40 INJECTION, POWDER, FOR SOLUTION INTRAMUSCULAR; INTRAVENOUS at 14:48

## 2018-10-10 RX ADMIN — PANTOPRAZOLE SODIUM SCH MG: 40 TABLET, DELAYED RELEASE ORAL at 09:59

## 2018-10-10 RX ADMIN — IPRATROPIUM BROMIDE AND ALBUTEROL SULFATE SCH ML: .5; 3 SOLUTION RESPIRATORY (INHALATION) at 20:40

## 2018-10-10 RX ADMIN — EFAVIRENZ, EMTRICITABINE, AND TENOFOVIR DISOPROXIL FUMARATE SCH TAB: 600; 200; 300 TABLET, FILM COATED ORAL at 22:30

## 2018-10-10 RX ADMIN — SULFAMETHOXAZOLE AND TRIMETHOPRIM SCH MLS/HR: 80; 16 INJECTION, SOLUTION, CONCENTRATE INTRAVENOUS at 15:33

## 2018-10-10 RX ADMIN — IPRATROPIUM BROMIDE AND ALBUTEROL SULFATE SCH ML: .5; 3 SOLUTION RESPIRATORY (INHALATION) at 08:05

## 2018-10-10 RX ADMIN — MEROPENEM SCH MLS/HR: 1 INJECTION INTRAVENOUS at 14:35

## 2018-10-10 RX ADMIN — SULFAMETHOXAZOLE AND TRIMETHOPRIM SCH MLS/HR: 80; 16 INJECTION, SOLUTION, CONCENTRATE INTRAVENOUS at 22:09

## 2018-10-10 RX ADMIN — INSULIN LISPRO SCH: 100 INJECTION, SOLUTION INTRAVENOUS; SUBCUTANEOUS at 18:29

## 2018-10-10 RX ADMIN — INSULIN LISPRO SCH: 100 INJECTION, SOLUTION INTRAVENOUS; SUBCUTANEOUS at 09:54

## 2018-10-10 RX ADMIN — MEROPENEM SCH MLS/HR: 1 INJECTION INTRAVENOUS at 22:09

## 2018-10-10 RX ADMIN — SULFAMETHOXAZOLE AND TRIMETHOPRIM SCH MLS/HR: 80; 16 INJECTION, SOLUTION, CONCENTRATE INTRAVENOUS at 06:31

## 2018-10-10 RX ADMIN — INSULIN LISPRO SCH: 100 INJECTION, SOLUTION INTRAVENOUS; SUBCUTANEOUS at 22:30

## 2018-10-10 NOTE — PN
DATE:  10/10/2018



PULMONARY NOTE



SUBJECTIVE:  The patient appears comfortable this morning.  He is not short

of breath at rest.



PHYSICAL EXAMINATION:

VITAL SIGNS:  Temperature is 98, pulse 98, respirations 20, blood pressure

149/98.  Oxygen saturation on high-flow delivery is 98%.

HEENT:  Normocephalic, atraumatic.  No JVD.

CARDIOVASCULAR:  Positive S1, S2.  No S3 gallop.

LUNGS:  Still with minimal rhonchi and wheezing noted in the right lung. 

Decreased breath sounds - left lung.

EXTREMITIES:  No clubbing, cyanosis, or edema.  Calves are nontender to

palpation.

GASTROINTESTINAL:  Abdomen is soft, nontender, and nondistended.  Bowel

sounds are positive.

SKIN:  No acute rash.

NEUROLOGIC:  Limited at the present time.



PERTINENT LABORATORY DATA:  Chest x-ray was done this morning and reviewed.

The chest x-ray is not significantly changed from the previous film.



IMPRESSION:

1.  Acute hypoxemic respiratory failure.

2.  Right lower lobe pneumonia.

3.  Advanced lung cancer.

4.  Advanced chronic obstructive pulmonary disease.

5.  Mild bronchospasm.



PLAN:  The patient appears comfortable this morning.  He is not short of

breath at rest.  He does state to feeling much better overall.  I did

discuss the case with the night nurse at length.  The night nurse stated

that the patient had a pretty good night.



I did review the chest x-ray from this morning.  The chest x-ray shows no

significant change from the previous film.  On physical exam, there is less

bronchospasm noted.  In addition, the alveolar-arterial gradient is also

less.  I will continue with the current nebulizer treatments and

intravenous steroids for now.  The patient remains on antibiotic therapy -

as per Infectious Disease.  Input by Dr. Cuellar is noted.  The temperatures

have now fully resolved.  The leukocytosis has also fully resolved.



Clinical status of the patient is certainly improved - compared to the

initial presentation.  However, again, the future status/prognosis for this

patient does remain poor.  I will discuss the above with the attending

physician this morning.







__________________________________________

Bennie Shepherd MD



DD:  10/10/2018 8:04:44

DT:  10/10/2018 8:07:12

Job # [id number]

HEMA

## 2018-10-10 NOTE — CARD
--------------- APPROVED REPORT --------------





Date of service: 10/09/2018



EXAM: Two-dimensional and M-mode echocardiogram with Doppler and 

color Doppler.



INDICATION

Congestive Heart Failure 



2D DIMENSIONS 

IVSd1.0   (0.7-1.1cm)LVDd4.8   (3.9-5.9cm)

PWd1.1   (0.7-1.1cm)LVDs3.6   (2.5-4.0cm)

FS (%) 25.4   %LVEF (%)49.9   (>50%)



M-Mode DIMENSIONS 

Aortic Root3.80   (2.2-3.7cm)Aortic Cusp Exc.1.50   (1.5-2.0cm)



Aortic Valve

AoV Peak Msqocihm870.0cm/Дмитрий Peak GR.7mmHg



Mitral Valve

MV E Qpkgfkdy40.2cm/sMV A Gtqamukd77.7cm/sE/A ratio1.6



TDI

Lateral E' Peak V13.10cm/sMedial E' Peak V9.16cm/sE/Lateral E'7.4

E/Medial E'10.6



Pulmonary Valve

PV Peak Uutyppsu11.0cm/sPV Peak Grad.1mmHg



Tricuspid Valve

TR Peak Pgjkzkdy223bm/sRAP [id number] Peak Gr.35mmHg

[id number]



 LEFT VENTRICLE 

The left ventricle is normal size. The left ventricular function is 

normal. The left ventricular ejection fraction is within the normal 

range.Ej.Fr:50%.



 RIGHT VENTRICLE 

The right ventricle is mildly dilated.



 ATRIA 

The left atrium is mildly dilated. The right atrium is mildly dilated.



 AORTIC VALVE 

Aortic Valve thickened but opening is Normal.



 MITRAL VALVE 

Thickened Mitral Valve Opening Normal. Mitral regurgitation is 

moderate.



 TRICUSPID VALVE 

The tricuspid valve is normal in structure. There is moderate 

tricuspid regurgitation. RVSP 45mm Hg. Mild Pulmonary Hypertension.



 PERICARDIAL EFFUSION 

There is no pericardial effusion.



<Conclusion>

The left ventricle is normal size.

The left ventricular function is normal.

The left ventricular ejection fraction is within the normal 

range.Ej.Fr:50%.

The right ventricle is mildly dilated.

The left atrium is mildly dilated.

The right atrium is mildly dilated.

Aortic Valve thickened but opening is Normal.

Thickened Mitral Valve Opening Normal.

Mitral regurgitation is moderate.

There is no pericardial effusion.

The tricuspid valve is normal in structure.

There is moderate tricuspid regurgitation.

RVSP 45mm Hg. Mild Pulmonary Hypertension.

## 2018-10-10 NOTE — CP.PCM.PN
Subjective





- Date & Time of Evaluation


Date of Evaluation: 10/10/18


Time of Evaluation: 11:05





- Subjective


Subjective: 





Still having difficulty breathing, no fevers, no chest pain. 





Objective





- Vital Signs/Intake and Output


Vital Signs (last 24 hours): 


                                        











Temp Pulse Resp BP Pulse Ox


 


 98.0 F   98 H  21   149/98 H  98 


 


 10/10/18 05:59  10/10/18 05:59  10/10/18 05:59  10/10/18 05:59  10/10/18 05:59








Intake and Output: 


                                        











 10/09/18 10/10/18





 18:59 06:59


 


Intake Total  0


 


Output Total  350


 


Balance  -350














- Medications


Medications: 


                               Current Medications





Acetaminophen (Tylenol 325mg Tab)  650 mg PO Q6H PRN


   PRN Reason: Fever >100.4 F


   Last Admin: 10/08/18 17:42 Dose:  650 mg


Albuterol/Ipratropium (Duoneb 3 Mg/0.5 Mg (3 Ml) Ud)  3 ml IH [id number] ALBINA


   Last Admin: 10/10/18 02:40 Dose:  Not Given


Azithromycin (Zithromax)  500 mg PO DAILY ALBINA; Protocol


   Last Admin: 10/09/18 11:50 Dose:  500 mg


Diazepam (Valium)  5 mg PO HS ALBINA; Protocol


   Last Admin: 10/09/18 22:16 Dose:  5 mg


Efavirenz/Emtricitabine/Tenofovir (Atripla 600 Mg-200 Mg-300 Mg)  1 tab PO HS 

ALBINA; Protocol


   Last Admin: 10/09/18 22:21 Dose:  1 tab


Heparin Sodium (Porcine) (Heparin)  5,000 units SC Q8 ALBINA; Protocol


   Last Admin: 10/10/18 05:26 Dose:  5,000 units


Sodium Chloride (Sodium Chloride 0.9%)  1,000 mls @ 100 mls/hr IV .Q10H ALBINA


   Last Admin: 10/10/18 06:32 Dose:  100 mls/hr


Meropenem (Merrem Iv 1 Gm Premix)  1 gm in 50 mls @ 100 mls/hr IVPB Q8 ALBINA; 

Protocol


   Last Admin: 10/10/18 05:26 Dose:  100 mls/hr


Trimethoprim/Sulfamethoxazole (370 mg/ Dextrose)  500 mls @ 250 mls/hr IVPB Q8 

ALBINA


   Last Admin: 10/10/18 06:31 Dose:  250 mls/hr


Insulin Human Lispro (Humalog Low)  0 units SC ACHS ALBINA; Protocol


   Last Admin: 10/09/18 22:30 Dose:  Not Given


Methylprednisolone (Solu-Medrol)  40 mg IVP Q8H [facility]


   Last Admin: 10/10/18 05:27 Dose:  40 mg


Pantoprazole Sodium (Protonix Ec Tab)  40 mg PO ACB ALBINA











- Labs


Labs: 


                                        





                                 10/09/18 05:40 





                                 10/09/18 05:40 











- Constitutional


Appears: Chronically Ill





- Head Exam


Head Exam: NORMAL INSPECTION





- Respiratory Exam


Respiratory Exam: Decreased Breath Sounds, Rales (scattered)





- Cardiovascular Exam


Cardiovascular Exam: +S1, +S2





- GI/Abdominal Exam


GI & Abdominal Exam: Soft.  absent: Tenderness





Assessment and Plan





- Assessment and Plan (Free Text)


Plan: 





Assessment


Severe sepsis with acute hypoxic respiratory failure due to right lower lobe 

HCAP, R/O PJP in this patient with chronic HIV infection


metastatic lung cancer (S/P left lung resection, but now has a newly discovered 

right lung mass)


HTN





Plan


continue Bactrim IV, Merrem and Zithromax (day 3); follow up LDH and Fungitell -

would be ideal to have patient undergo CT chest


discussed with Dr. Shepherd


follow up CD4 count and HIV VL - as per patient, he takes Atripla and his VL is 

undetectable


will continue to monitor clinically

## 2018-10-10 NOTE — CP.PCM.CON
History of Present Illness





- History of Present Illness


History of Present Illness: 





Palliative consult requested by Dr SALEEM Shepherd


copied to Dr EDGAR Lawrence


Reason: Goals of care





68 year old male with history of left lung cancer s/p penumonectomy, HTN, HIV 

who presented to the ED on 10/8 with shortness of breath and hypoxemia. During 

recent hospitalization he was found to found to have a new right lung mass, 

biopsy revealed squamous cell carcinoma. He is s/p chemotherapy. He denied 

fever, chills, night sweats, nausea,vomiting, diarrhea, dizziness, headache 

chest or abdominal pain. 





Labs 10/8: Wbc, 14.0, Hgb 16.7, Plt 196,< , K 4.1, Bun 25, Crt 1.0. 

glucose 253, Ast 34, Alt 26, , Troponin 0.06, ,T Prot. 8.4, 

Procalcitionin 0.05. Blood cultures negative, L Pneumophelia negative.


Chest  ray:: new patchy alveloar infiltrate in right lung, no change in the 

opacified left lung.


PET CT 9/26/18: large 9.3X 8.0X8.3cm multi lobulated FDG avid mass lesion 

located in the anterior/superior left lower lobe with extension to the right 

hilum, metastatic adenopathy in the mediastinum,precarinal/pertracheal region 

measuring 4.0X2.75 cm, as well as large subcarinal mass  3.0X2.2 cm, mass like 

appearance in the proximal esophogus>endo recommended, left pneumonectomy, 

possible left true vocal cord paralysis 





PMHx: lung cancer s/p pneumonectomy 2014, new metastatic right lung lesion 

>squamous cell cancer s/p chemotherapy,HIV, HTN, HIV, HTN,DM





Social History: Former heavy smoker, no drugs or alcohol use. Retired, lives w

ith family.





Family History:Non contributory





Advance Care Planning: The patient does not have an Advanced Directive





Past Patient History





- Infectious Disease


Hx of Infectious Diseases: None





- Past Social History


Smoking Status: Unknown If Ever Smoked





- CARDIAC


Hx Pacemaker: No





- PULMONARY


Hx Lung Cancer: Yes


Other/Comment: Left pneumonectomy.  Mass noted to the Rt. lung





- NEUROLOGICAL


Hx Neurological Disorder: No





- HEENT


Hx HEENT Problems: Yes (eyeglasses)





- RENAL


Hx Chronic Kidney Disease: No





- ENDOCRINE/METABOLIC


Hx Endocrine Disorders: No





- HEMATOLOGICAL/ONCOLOGICAL


Hx Blood Transfusions: No





- INTEGUMENTARY


Hx Dermatological Problems: Yes


Other/Comment: multiple tatoos





- MUSCULOSKELETAL/RHEUMATOLOGICAL


Hx Musculoskeletal Disorders: No





- GASTROINTESTINAL


Hx Gastrointestinal Disorders: Yes (weight loss, appetite good)





- GENITOURINARY/GYNECOLOGICAL


Hx Genitourinary Disorders: No





- PSYCHIATRIC


Hx Emotional Abuse: No


Hx Physical Abuse: No





- SURGICAL HISTORY


Hx Surgeries: Yes (LEFT PNEUMONECTOMY, BILATERAL MENISCUS SURGERY)





- ANESTHESIA


Hx Anesthesia Reactions: No





Meds


Allergies/Adverse Reactions: 


                                    Allergies











Allergy/AdvReac Type Severity Reaction Status Date / Time


 


No Known Allergies Allergy   Verified 10/08/18 07:29














- Medications


Medications: 


                               Current Medications





Acetaminophen (Tylenol 325mg Tab)  650 mg PO Q6H PRN


   PRN Reason: Fever >100.4 F


   Last Admin: 10/08/18 17:42 Dose:  650 mg


Albuterol/Ipratropium (Duoneb 3 Mg/0.5 Mg (3 Ml) Ud)  3 ml IH [id number] [facility]


   Last Admin: 10/10/18 08:05 Dose:  3 ml


Albuterol/Ipratropium (Duoneb 3 Mg/0.5 Mg (3 Ml) Ud)  3 ml IH Q2H PRN


   PRN Reason: Shortness of Breath


Azithromycin (Zithromax)  500 mg PO DAILY [facility]; Protocol


   Last Admin: 10/09/18 11:50 Dose:  500 mg


Diazepam (Valium)  5 mg PO HS [facility]; Protocol


   Last Admin: 10/09/18 22:16 Dose:  5 mg


Efavirenz/Emtricitabine/Tenofovir (Atripla 600 Mg-200 Mg-300 Mg)  1 tab PO HS S

; Protocol


   Last Admin: 10/09/18 22:21 Dose:  1 tab


Heparin Sodium (Porcine) (Heparin)  5,000 units SC Q8 [facility]; Protocol


   Last Admin: 10/10/18 05:26 Dose:  5,000 units


Sodium Chloride (Sodium Chloride 0.9%)  1,000 mls @ 100 mls/hr IV .Q10H [facility]


   Last Admin: 10/10/18 06:32 Dose:  100 mls/hr


Meropenem (Merrem Iv 1 Gm Premix)  1 gm in 50 mls @ 100 mls/hr IVPB Q8 [facility]; 

Protocol


   Last Admin: 10/10/18 05:26 Dose:  100 mls/hr


Trimethoprim/Sulfamethoxazole (370 mg/ Dextrose)  500 mls @ 250 mls/hr IVPB Q8 

[facility]


   Last Admin: 10/10/18 06:31 Dose:  250 mls/hr


Insulin Human Lispro (Humalog Low)  0 units SC ACHS [facility]; Protocol


   Last Admin: 10/10/18 09:54 Dose:  Not Given


Methylprednisolone (Solu-Medrol)  40 mg IVP Q8H [facility]


   Last Admin: 10/10/18 05:27 Dose:  40 mg


Pantoprazole Sodium (Protonix Ec Tab)  40 mg PO ACB [facility]











Physical Exam





- Constitutional


Appears: Cachectic, Chronically Ill





- Head Exam


Head Exam: NORMOCEPHALIC





- Eye Exam


Eye Exam: Normal appearance, PERRL





- ENT Exam


ENT Exam: Mucous Membranes Moist





- Respiratory Exam


Respiratory Exam: Decreased Breath Sounds, Rhonchi





- Cardiovascular Exam


Cardiovascular Exam: +S1, +S2





- GI/Abdominal Exam


GI & Abdominal Exam: Normal Bowel Sounds, Soft


Additional comments: 





no tenderness





Results





- Vital Signs


Recent Vital Signs: 


                                Last Vital Signs











Temp  98.0 F   10/10/18 05:59


 


Pulse  98 H  10/10/18 05:59


 


Resp  21   10/10/18 05:59


 


BP  149/98 H  10/10/18 05:59


 


Pulse Ox  98   10/10/18 05:59














- Labs


Result Diagrams: 


                                 10/10/18 06:30





                                 10/10/18 06:30


Labs: 


                         Laboratory Results - last 24 hr











  10/08/18 10/09/18 10/09/18





  08:30 06:30 14:30


 


WBC   


 


RBC   


 


Hgb   


 


Hct   


 


MCV   


 


MCH   


 


MCHC   


 


RDW   


 


Plt Count   


 


MPV   


 


Gran %   


 


Lymph % (Auto)   


 


Mono % (Auto)   


 


Eos % (Auto)   


 


Baso % (Auto)   


 


Gran #   


 


Lymph # (Auto)   


 


Mono # (Auto)   


 


Eos # (Auto)   


 


Baso # (Auto)   


 


pCO2   


 


pO2   


 


HCO3   


 


ABG pH   


 


ABG Total CO2   


 


ABG O2 Saturation   


 


ABG Base Excess   


 


ABG Potassium   


 


Glucose   


 


Lactate   


 


FiO2   


 


Sodium   


 


Potassium   


 


Chloride   


 


Carbon Dioxide   


 


Anion Gap   


 


BUN   


 


Creatinine   


 


Est GFR ( Amer)   


 


Est GFR (Non-Af Amer)   


 


POC Glucose (mg/dL)   


 


Random Glucose   


 


Calcium   


 


Total Bilirubin   


 


AST   


 


ALT   


 


Alkaline Phosphatase   


 


Lactate Dehydrogenase   674 


 


Total Protein   


 


Albumin   


 


Globulin   


 


Albumin/Globulin Ratio   


 


Procalcitonin  < 0.05 L  


 


Arterial Blood Potassium   


 


Ur L.pneumophila Ag    Negative














  10/09/18 10/10/18 10/10/18





  21:46 06:30 06:30


 


WBC   9.7 


 


RBC   6.28 H 


 


Hgb   14.6 


 


Hct   46.1 


 


MCV   73.4 L 


 


MCH   23.2 L 


 


MCHC   31.7 


 


RDW   14.3 


 


Plt Count   198 


 


MPV   10.8 


 


Gran %   85.1 H 


 


Lymph % (Auto)   11.8 L 


 


Mono % (Auto)   3.1 


 


Eos % (Auto)   0.0 L 


 


Baso % (Auto)   0.0 


 


Gran #   8.27 H 


 


Lymph # (Auto)   1.2 


 


Mono # (Auto)   0.3 


 


Eos # (Auto)   0.0 


 


Baso # (Auto)   0.00 


 


pCO2   


 


pO2   


 


HCO3   


 


ABG pH   


 


ABG Total CO2   


 


ABG O2 Saturation   


 


ABG Base Excess   


 


ABG Potassium   


 


Glucose   


 


Lactate   


 


FiO2   


 


Sodium    141


 


Potassium    4.3


 


Chloride    107


 


Carbon Dioxide    26


 


Anion Gap    12


 


BUN    20


 


Creatinine    0.9


 


Est GFR ( Amer)    > 60


 


Est GFR (Non-Af Amer)    > 60


 


POC Glucose (mg/dL)  105  


 


Random Glucose    156 H


 


Calcium    7.9 L


 


Total Bilirubin    0.7


 


AST    43


 


ALT    38


 


Alkaline Phosphatase    87


 


Lactate Dehydrogenase   


 


Total Protein    7.1


 


Albumin    3.3


 


Globulin    3.8


 


Albumin/Globulin Ratio    0.9 L


 


Procalcitonin   


 


Arterial Blood Potassium   


 


Ur L.pneumophila Ag   














  10/10/18 10/10/18





  08:15 08:28


 


WBC  


 


RBC  


 


Hgb  


 


Hct  


 


MCV  


 


MCH  


 


MCHC  


 


RDW  


 


Plt Count  


 


MPV  


 


Gran %  


 


Lymph % (Auto)  


 


Mono % (Auto)  


 


Eos % (Auto)  


 


Baso % (Auto)  


 


Gran #  


 


Lymph # (Auto)  


 


Mono # (Auto)  


 


Eos # (Auto)  


 


Baso # (Auto)  


 


pCO2  36 


 


pO2  60.0 L 


 


HCO3  21.8 


 


ABG pH  7.39 


 


ABG Total CO2  22.9 


 


ABG O2 Saturation  93.6 L 


 


ABG Base Excess  -2.6 L 


 


ABG Potassium  4.0 


 


Glucose  219 H 


 


Lactate  2.1 


 


FiO2  60.0 


 


Sodium  137.0 


 


Potassium  


 


Chloride  104.0 


 


Carbon Dioxide  


 


Anion Gap  


 


BUN  


 


Creatinine  


 


Est GFR ( Amer)  


 


Est GFR (Non-Af Amer)  


 


POC Glucose (mg/dL)   217 H


 


Random Glucose  


 


Calcium  


 


Total Bilirubin  


 


AST  


 


ALT  


 


Alkaline Phosphatase  


 


Lactate Dehydrogenase  


 


Total Protein  


 


Albumin  


 


Globulin  


 


Albumin/Globulin Ratio  


 


Procalcitonin  


 


Arterial Blood Potassium  4.0 


 


Ur L.pneumophila Ag  














Assessment & Plan





- Assessment and Plan (Free Text)


Assessment: 





68 year old male with history of metastatic squamous cell cancer  s/p 

chemotherapy, HIV, HTN, DM who is admitted with respiratory insufficiency, 

hypoxia, shortness of breath,leukocytosis, pneumonia.





Patient is alert and oriented, dyspniec. Goals of care discussed. Patient 

intends to continue chemotherapy, will f/u with Dr Reynoso regarding next 

treatment. Advance care planning alos discussed. Patient states he does not want

to be intubated or have CPR. He understands the ramifications of these 

procedures and is clear he does not want them. He appointed his w ex wife as his

health care surrogate. He states that he hasn't discussed this with his family. 

I encouraged him to do so. I also offered to meet with his familyto assist with 

this conversation.





Time spent with aptinet in goals of care and advance care plannig discussion, 30

minutes


Plan: 





Goals of care and advance care planning, POLST: DNR/DNI. 





Sepsis:ID following on  Vanocomycin , Merrem ,Zithromax. LDH, Fungitell  assay 

CD4 and HIV VL  pending.





Pulmonary: Dr. Shepherd following. Daily chest x ray, continue nebulizers, IV 

steroids.





Squamous Cell Lung Cancer: Dr Reynoso will f/u as out patient

## 2018-10-10 NOTE — RAD
Date of service: 



10/10/2018



HISTORY:

 follow up 



COMPARISON:

10/09/2018 



FINDINGS:



LUNGS:

No change in appearance of chronic left-sided opacification.  Patchy 

infiltrate in the right lung and right lower lobe lung mass



PLEURA:

No significant pleural effusion identified, no pneumothorax apparent.



CARDIOVASCULAR:

Normal.



OSSEOUS STRUCTURES:

No significant abnormalities.



VISUALIZED UPPER ABDOMEN:

Normal.



OTHER FINDINGS:

None.



IMPRESSION:

No change in appearance of chronic left-sided opacification.  Patchy 

infiltrate in the right lung and right lower lobe lung mass

## 2018-10-10 NOTE — PN
DATE:  10/10/2018



HISTORY OF PRESENT ILLNESS:  Mr. Mejia is a 68-year-old male with remote

history of lung cancer status post left pneumonectomy several years ago. 

He presented with extreme shortness of breath, hypoxic respiratory failure.

He had recent CAT scan done which showed recurrence of lung cancer, large

mediastinal mass extending into the left lower lobe around 9 cm, total

whiteout of the left lung _____ right lower lobe pneumonitis.  He was

admitted to ICU transferred out of ICU.  He is currently on high-flow

oxygen very tachycardiac, tachypneic, short of breath, cannot finish full

sentences.



REVIEW OF SYSTEMS:  As per HPI.  Rest of 12-point review systems reviewed

negative.



SOCIAL HISTORY:  HIV positive, former smoker.  No history of drug abuse.



ALLERGIES:  NO KNOWN DRUG ALLERGIES.



FAMILY HISTORY:  Noncontributory.



SOCIAL HISTORY:  Lives at home with wife.



PHYSICAL EXAMINATION:

GENERAL:  Tachypneic, tachycardiac, hypoxia, on high-flow oxygen.  Not able

to lay flat, not able to complete sentences.

HEENT:  Pallor positive.

NECK:  No lymphadenopathy.

CHEST:  Air entry decreased on both sides, conducted breath sounds present.

CARDIOVASCULAR:  Tachycardia present.

ABDOMEN:  Soft, nontender.  No hepatosplenomegaly.

EXTREMITIES:  No edema.

NEUROLOGIC:  Alert, oriented x3.  No focal sensory motor deficit.



LABORATORY DATA:   White count 9.7, hemoglobin 14.6, hematocrit 46.1,

platelet 198.  Glucose 172.  Legionella negative.



MEDICATION:   Tylenol 650 every 6 hour p.r.n., DuoNeb every 6 hour, 

Zithromax 500 p.o. daily, Valium 5 mg p.o. at bedtime at _____, heparin

5000 subcu every 8 hours, insulin sliding scale, Solu-Medrol 40 mg every 8

hour, Protonix 40 mg daily, IV fluids at 100 mL an hour, Bactrim 250-370 mg

every 8 hours.



ASSESSMENT/PLAN

1.  Recurrent lung cancer.

2.  Hypoxic respiratory failure number

3.  Right lower lobe pneumonia.



PLAN:  Currently on high-flow oxygen.  We will continue IV antibiotic,

meropenem and Bactrim.  ID following.  Dr. Cuellar's notes reviewed. 

Continue bronchodilators, Solu-Medrol every 8 hour.  Pulmonary, Dr. Shepherd

following.  Condition remained critical.  Discussed with the wife at

bedside.  Discussed with Dr. Reynoso regarding initiation of chemotherapy. 

He is currently not stable to be discharged.  Chemotherapy will be

initiated when he is stable  clinically.  Discussed with the

staff nurse.





__________________________________________

Isabela Stewart MD





DD:  10/10/2018 21:27:10

DT:  10/10/2018 21:30:19

Job # [id number]



HEMA

## 2018-10-11 LAB
% CD4 (T HELPER CELL): 27 PERCENT (ref 30–61)
% CD8 (SUPPRESSOR T CELL): 42 PERCENT (ref 12–42)
ABSOLUTE CD4 CELLS: 318 CELLS/MCL (ref 490–1740)
ABSOLUTE CD8 CELLS: 493 CELLS/MCL (ref 180–1170)
ABSOLUTE LYMPHOCYTES: 1186 CELLS/MCL (ref 850–3900)
ALBUMIN SERPL-MCNC: 2.1 G/DL (ref 3–4.8)
ALBUMIN/GLOB SERPL: 0.6 {RATIO} (ref 1.1–1.8)
ALT SERPL-CCNC: 61 U/L (ref 7–56)
AST SERPL-CCNC: 60 U/L (ref 17–59)
BASOPHILS # BLD AUTO: 0 K/MM3 (ref 0–2)
BASOPHILS NFR BLD: 0 % (ref 0–3)
BUN SERPL-MCNC: 17 MG/DL (ref 7–21)
CALCIUM SERPL-MCNC: 5.7 MG/DL (ref 8.4–10.5)
EOSINOPHIL # BLD: 0 10*3/UL (ref 0–0.7)
EOSINOPHIL NFR BLD: 0 % (ref 1.5–5)
ERYTHROCYTE [DISTWIDTH] IN BLOOD BY AUTOMATED COUNT: 14 % (ref 11.5–14.5)
GFR NON-AFRICAN AMERICAN: > 60
GRANULOCYTES # BLD: 6.75 10*3/UL (ref 1.4–6.5)
GRANULOCYTES NFR BLD: 81.4 % (ref 50–68)
HDLC SERPL-MCNC: 25 MG/DL (ref 29–60)
HELPER/SUPPRESSOR RATIO: 0.65 RATIO (ref 0.86–5)
HGB BLD-MCNC: 11.1 G/DL (ref 14–18)
LDLC SERPL-MCNC: 56 MG/DL (ref 0–129)
LYMPHOCYTES # BLD: 1 10*3/UL (ref 1.2–3.4)
LYMPHOCYTES NFR BLD AUTO: 12.5 % (ref 22–35)
MCH RBC QN AUTO: 22.5 PG (ref 25–35)
MCHC RBC AUTO-ENTMCNC: 31 G/DL (ref 31–37)
MCV RBC AUTO: 72.5 FL (ref 80–105)
MONOCYTES # BLD AUTO: 0.5 10*3/UL (ref 0.1–0.6)
MONOCYTES NFR BLD: 6.1 % (ref 1–6)
PLATELET # BLD: 164 10^3/UL (ref 120–450)
PMV BLD AUTO: 10.7 FL (ref 7–11)
RBC # BLD AUTO: 4.94 10^6/UL (ref 3.5–6.1)
WBC # BLD AUTO: 8.3 10^3/UL (ref 4.5–11)

## 2018-10-11 RX ADMIN — IPRATROPIUM BROMIDE AND ALBUTEROL SULFATE SCH ML: .5; 3 SOLUTION RESPIRATORY (INHALATION) at 02:00

## 2018-10-11 RX ADMIN — METHYLPREDNISOLONE SODIUM SUCCINATE SCH MG: 40 INJECTION, POWDER, FOR SOLUTION INTRAMUSCULAR; INTRAVENOUS at 21:54

## 2018-10-11 RX ADMIN — POTASSIUM CHLORIDE SCH MEQ: 20 TABLET, EXTENDED RELEASE ORAL at 18:23

## 2018-10-11 RX ADMIN — SULFAMETHOXAZOLE AND TRIMETHOPRIM SCH MLS/HR: 80; 16 INJECTION, SOLUTION, CONCENTRATE INTRAVENOUS at 05:37

## 2018-10-11 RX ADMIN — MEROPENEM SCH MLS/HR: 1 INJECTION INTRAVENOUS at 14:26

## 2018-10-11 RX ADMIN — IPRATROPIUM BROMIDE AND ALBUTEROL SULFATE SCH ML: .5; 3 SOLUTION RESPIRATORY (INHALATION) at 14:30

## 2018-10-11 RX ADMIN — SULFAMETHOXAZOLE AND TRIMETHOPRIM SCH MLS/HR: 80; 16 INJECTION, SOLUTION, CONCENTRATE INTRAVENOUS at 15:23

## 2018-10-11 RX ADMIN — SULFAMETHOXAZOLE AND TRIMETHOPRIM SCH MLS/HR: 80; 16 INJECTION, SOLUTION, CONCENTRATE INTRAVENOUS at 21:54

## 2018-10-11 RX ADMIN — CALCIUM CARBONATE-CHOLECALCIFEROL TAB 250 MG-125 UNIT SCH TAB: 250-125 TAB at 10:05

## 2018-10-11 RX ADMIN — PANTOPRAZOLE SODIUM SCH MG: 40 TABLET, DELAYED RELEASE ORAL at 08:40

## 2018-10-11 RX ADMIN — METHYLPREDNISOLONE SODIUM SUCCINATE SCH MG: 40 INJECTION, POWDER, FOR SOLUTION INTRAMUSCULAR; INTRAVENOUS at 05:38

## 2018-10-11 RX ADMIN — POTASSIUM CHLORIDE SCH MEQ: 20 TABLET, EXTENDED RELEASE ORAL at 09:30

## 2018-10-11 RX ADMIN — INSULIN LISPRO SCH: 100 INJECTION, SOLUTION INTRAVENOUS; SUBCUTANEOUS at 08:39

## 2018-10-11 RX ADMIN — INSULIN LISPRO SCH: 100 INJECTION, SOLUTION INTRAVENOUS; SUBCUTANEOUS at 12:27

## 2018-10-11 RX ADMIN — EFAVIRENZ, EMTRICITABINE, AND TENOFOVIR DISOPROXIL FUMARATE SCH TAB: 600; 200; 300 TABLET, FILM COATED ORAL at 21:52

## 2018-10-11 RX ADMIN — INSULIN LISPRO SCH: 100 INJECTION, SOLUTION INTRAVENOUS; SUBCUTANEOUS at 17:58

## 2018-10-11 RX ADMIN — IPRATROPIUM BROMIDE AND ALBUTEROL SULFATE SCH ML: .5; 3 SOLUTION RESPIRATORY (INHALATION) at 20:21

## 2018-10-11 RX ADMIN — MEROPENEM SCH MLS/HR: 1 INJECTION INTRAVENOUS at 21:49

## 2018-10-11 RX ADMIN — IPRATROPIUM BROMIDE AND ALBUTEROL SULFATE SCH ML: .5; 3 SOLUTION RESPIRATORY (INHALATION) at 09:31

## 2018-10-11 RX ADMIN — MEROPENEM SCH MLS/HR: 1 INJECTION INTRAVENOUS at 05:39

## 2018-10-11 RX ADMIN — INSULIN LISPRO SCH: 100 INJECTION, SOLUTION INTRAVENOUS; SUBCUTANEOUS at 22:00

## 2018-10-11 RX ADMIN — METHYLPREDNISOLONE SODIUM SUCCINATE SCH MG: 40 INJECTION, POWDER, FOR SOLUTION INTRAMUSCULAR; INTRAVENOUS at 14:26

## 2018-10-11 NOTE — CP.PCM.PN
Subjective





- Date & Time of Evaluation


Date of Evaluation: 10/11/18


Time of Evaluation: 10:50





- Subjective


Subjective: 





Still with shortness of breath, no fevers, but feeling a little better.





Objective





- Vital Signs/Intake and Output


Vital Signs (last 24 hours): 


                                        











Temp Pulse Resp BP Pulse Ox


 


 97.8 F   89   20   129/92 H  97 


 


 10/11/18 06:00  10/11/18 06:00  10/11/18 06:00  10/11/18 06:00  10/11/18 06:00








Intake and Output: 


                                        











 10/10/18 10/11/18





 18:59 06:59


 


Intake Total 390 4040


 


Output Total 750 700


 


Balance -360 3340














- Medications


Medications: 


                               Current Medications





Acetaminophen (Tylenol 325mg Tab)  650 mg PO Q6H PRN


   PRN Reason: Fever >100.4 F


   Last Admin: 10/08/18 17:42 Dose:  650 mg


Albuterol/Ipratropium (Duoneb 3 Mg/0.5 Mg (3 Ml) Ud)  3 ml IH [id number] [facility]


   Last Admin: 10/11/18 02:00 Dose:  3 ml


Albuterol/Ipratropium (Duoneb 3 Mg/0.5 Mg (3 Ml) Ud)  3 ml IH Q2H PRN


   PRN Reason: Shortness of Breath


Azithromycin (Zithromax)  500 mg PO DAILY [facility]; Protocol


   Last Admin: 10/10/18 09:59 Dose:  500 mg


Diazepam (Valium)  5 mg PO HS [facility]; Protocol


   Last Admin: 10/10/18 23:38 Dose:  5 mg


Efavirenz/Emtricitabine/Tenofovir (Atripla 600 Mg-200 Mg-300 Mg)  1 tab PO HS 

[facility]; Protocol


   Last Admin: 10/10/18 22:30 Dose:  1 tab


Heparin Sodium (Porcine) (Heparin)  5,000 units SC Q8 [facility]; Protocol


   Last Admin: 10/11/18 05:38 Dose:  5,000 units


Sodium Chloride (Sodium Chloride 0.9%)  1,000 mls @ 100 mls/hr IV .Q10H [facility]


   Last Admin: 10/11/18 05:40 Dose:  100 mls/hr


Meropenem (Merrem Iv 1 Gm Premix)  1 gm in 50 mls @ 100 mls/hr IVPB Q8 [facility]; 

Protocol


   Last Admin: 10/11/18 05:39 Dose:  100 mls/hr


Trimethoprim/Sulfamethoxazole (370 mg/ Dextrose)  500 mls @ 250 mls/hr IVPB Q8 

[facility]


   Last Admin: 10/11/18 05:37 Dose:  250 mls/hr


Insulin Human Lispro (Humalog Low)  0 units SC ACHS [facility]; Protocol


   Last Admin: 10/10/18 22:30 Dose:  Not Given


Methylprednisolone (Solu-Medrol)  40 mg IVP Q8H [facility]


   Last Admin: 10/11/18 05:38 Dose:  40 mg


Pantoprazole Sodium (Protonix Ec Tab)  40 mg PO ACB [facility]


   Last Admin: 10/10/18 09:59 Dose:  40 mg











- Labs


Labs: 


                                        





                                 10/10/18 06:30 





                                 10/10/18 06:30 











- Constitutional


Appears: Chronically Ill





- Head Exam


Head Exam: NORMAL INSPECTION





- Neck Exam


Neck Exam: absent: Meningismus





- Respiratory Exam


Respiratory Exam: Decreased Breath Sounds, Rhonchi (scattered)





- Cardiovascular Exam


Cardiovascular Exam: +S1, +S2





- GI/Abdominal Exam


GI & Abdominal Exam: Soft.  absent: Tenderness





Assessment and Plan





- Assessment and Plan (Free Text)


Plan: 





Assessment


Severe sepsis with acute hypoxic respiratory failure due to right lower lobe 

HCAP, R/O PJP in this patient with chronic HIV infection


metastatic lung cancer (S/P left lung resection, but now has a newly discovered 

right lung mass)


HTN





Plan


continue Bactrim IV, Merrem and Zithromax (day 4); follow up LDH and Fungitell -

would be ideal to have patient undergo CT chest


discussed with Dr. Shepherd


CD4 count is 318 and HIV VL is 1.36 - patient is taking Atripla and he may need 

adjustment of his ART regimen as an outpatient


will continue to monitor clinically

## 2018-10-11 NOTE — PN
DATE:  10/11/2018



FOLLOWUP NOTE



SUBJECTIVE:  He is still in significant respiratory distress.  Calcium

declined to 5.7.  He has recurrent lung cancer, large mediastinal mass

extending ini to the left lower lobe, 9 cm.  Currently, on high-flow

oxygen.  Being treated for pneumonia, currently on IV antibiotics.



REVIEW OF SYSTEMS:  As per HPI.  Rest of 12-point review of systems

reviewed negative.



PHYSICAL EXAMINATION:

VITAL SIGNS:  Respiratory distress, respiratory rate 25 per minute, blood

pressure 129/92, heart rate is 104, oxygen saturation 97% on 100% oxygen,

temperature 97.8.  No fever.

HEENT:  Pallor positive.

NECK:  No lymphadenopathy.

CHEST:  Air entry decreased, bilateral.

CARDIOVASCULAR:  Tachycardia.  S1, S2 normal.  No murmur.  No gallop.

ABDOMEN:  Soft, nontender.  No hepatosplenomegaly.

EXTREMITY:  No edema.



LABORATORY DATA:  White count 8.3, hemoglobin 11, hematocrit 35.8, platelet

count 164.  Sodium 144, potassium 3.2, calcium 5.7, creatinine 0.8, glucose

121.



MEDICATIONS:  Tylenol 650 every 6 hours p.r.n., DuoNeb, Zithromax 500

daily, Valium 500 mg p.o. at bedtime, Atripla, sliding scale insulin,

Solu-Medrol, IV fluid at 100 mL an hour, Bactrim.



ASSESSMENT:

1.  Hypoxic respiratory failure.

2.  Recurrent lung cancer.

3.  Respiratory distress.

4.  Right lower lobe pneumonia.



PLAN:  Currently, on high-flow oxygen.  We will continue that.  Continue IV

antibiotics as per ID, meropenem and Bactrim.  We will replete the calcium,

calcium gluconate 1 g IV to be given today.  Potassium is low, hypokalemia,

3.2.  We will give oral potassium, K-Dur 20 mEq p.o. b.i.d.  Continue

bronchodilators.  Hemoglobin and hematocrit stable.  Renal functions within

normal limits.  Continue heparin for DVT prophylaxis.  The patient made

himself DNR/DNI.





__________________________________________

Isabela Stewart MD





DD:  10/11/2018 9:15:11

DT:  10/11/2018 10:22:26

Job # [id number]

## 2018-10-11 NOTE — PN
DATE:  10/11/2018



PULMONARY PROGRESS NOTE



SUBJECTIVE:  The patient is sitting in bed.  He states that he is markedly

improved.  He still has some minimal shortness of breath on exertion, but

at rest, he is fine.  He is anxious to be discharged.



PHYSICAL EXAMINATION:

VITAL SIGNS:  Remain stable.  He is afebrile.  O2 sat is 98% on

supplemental oxygen via nasal cannula, pulse is 68, respiratory rate 16.

HEENT:  Normocephalic, atraumatic.

NECK:  Supple.  No JVD.  No lymphadenopathy.  No bruit.

CARDIOVASCULAR:  Regular rhythm.  S1, S2 without murmur, gallop or rub.

CHEST:  Lungs, minimal rhonchi throughout both lung fields.  No longer are

wheezes auscultated.  Global decreased breath sounds on the left.

ABDOMEN:  Soft.  Bowel sounds normoactive without mass, guarding, rebound

or organomegaly.

EXTREMITIES:  Reveal no clubbing, cyanosis or edema.  There is no Homans'

sign.

SKIN:  Shows no rash or excoriation.

NEUROLOGIC:  No focal findings.



LABORATORY DATA:  No laboratory studies.  Chest x-ray done yesterday shows

no significant change.



CLINICAL IMPRESSION:

1.  Status post hypoxic respiratory failure.

2.  Right lower lobe pneumonia.

3.  Lung cancer (advanced).

4.  Chronic obstructive pulmonary disease (advanced).

5.  Resolving bronchospasm.



PLAN:  Continue vigorous bronchodilator with corticosteroids.  The patient

plans to be discharged soon.  He remains on antibiotic therapy as per

Infectious Disease.  Continue to monitor his white blood cell count and

signs of sepsis/infection.



We will discuss the long-term prognosis with his PMD and attempt to follow

his infection to resolution regarding his lungs cancer, this needs further

oncologic intervention.  We will discuss with you.





__________________________________________

Cisco Najera MD





DD:  10/11/2018 9:09:43

DT:  10/11/2018 9:12:21

Job # [id number]

## 2018-10-12 LAB
ALBUMIN SERPL-MCNC: 3.1 G/DL (ref 3–4.8)
ALBUMIN/GLOB SERPL: 1 {RATIO} (ref 1.1–1.8)
ALT SERPL-CCNC: 78 U/L (ref 7–56)
AST SERPL-CCNC: 57 U/L (ref 17–59)
BASOPHILS # BLD AUTO: 0 K/MM3 (ref 0–2)
BASOPHILS NFR BLD: 0 % (ref 0–3)
BUN SERPL-MCNC: 23 MG/DL (ref 7–21)
CALCIUM SERPL-MCNC: 8.1 MG/DL (ref 8.4–10.5)
EOSINOPHIL # BLD: 0 10*3/UL (ref 0–0.7)
EOSINOPHIL NFR BLD: 0 % (ref 1.5–5)
ERYTHROCYTE [DISTWIDTH] IN BLOOD BY AUTOMATED COUNT: 14 % (ref 11.5–14.5)
GFR NON-AFRICAN AMERICAN: > 60
GRANULOCYTES # BLD: 7.44 10*3/UL (ref 1.4–6.5)
GRANULOCYTES NFR BLD: 78.3 % (ref 50–68)
HGB BLD-MCNC: 13.5 G/DL (ref 14–18)
LYMPHOCYTES # BLD: 1.5 10*3/UL (ref 1.2–3.4)
LYMPHOCYTES NFR BLD AUTO: 15.2 % (ref 22–35)
MCH RBC QN AUTO: 22.8 PG (ref 25–35)
MCHC RBC AUTO-ENTMCNC: 31.9 G/DL (ref 31–37)
MCV RBC AUTO: 71.5 FL (ref 80–105)
MONOCYTES # BLD AUTO: 0.6 10*3/UL (ref 0.1–0.6)
MONOCYTES NFR BLD: 6.5 % (ref 1–6)
PLATELET # BLD: 200 10^3/UL (ref 120–450)
PMV BLD AUTO: 10.7 FL (ref 7–11)
RBC # BLD AUTO: 5.92 10^6/UL (ref 3.5–6.1)
WBC # BLD AUTO: 9.5 10^3/UL (ref 4.5–11)

## 2018-10-12 RX ADMIN — IPRATROPIUM BROMIDE AND ALBUTEROL SULFATE SCH ML: .5; 3 SOLUTION RESPIRATORY (INHALATION) at 08:10

## 2018-10-12 RX ADMIN — MEROPENEM SCH MLS/HR: 1 INJECTION INTRAVENOUS at 06:02

## 2018-10-12 RX ADMIN — EFAVIRENZ, EMTRICITABINE, AND TENOFOVIR DISOPROXIL FUMARATE SCH TAB: 600; 200; 300 TABLET, FILM COATED ORAL at 21:40

## 2018-10-12 RX ADMIN — INSULIN LISPRO SCH: 100 INJECTION, SOLUTION INTRAVENOUS; SUBCUTANEOUS at 16:54

## 2018-10-12 RX ADMIN — METHYLPREDNISOLONE SODIUM SUCCINATE SCH MG: 40 INJECTION, POWDER, FOR SOLUTION INTRAMUSCULAR; INTRAVENOUS at 21:40

## 2018-10-12 RX ADMIN — PANTOPRAZOLE SODIUM SCH MG: 40 TABLET, DELAYED RELEASE ORAL at 08:28

## 2018-10-12 RX ADMIN — MEROPENEM SCH MLS/HR: 1 INJECTION INTRAVENOUS at 14:38

## 2018-10-12 RX ADMIN — METHYLPREDNISOLONE SODIUM SUCCINATE SCH MG: 40 INJECTION, POWDER, FOR SOLUTION INTRAMUSCULAR; INTRAVENOUS at 14:37

## 2018-10-12 RX ADMIN — INSULIN LISPRO SCH: 100 INJECTION, SOLUTION INTRAVENOUS; SUBCUTANEOUS at 22:00

## 2018-10-12 RX ADMIN — SULFAMETHOXAZOLE AND TRIMETHOPRIM SCH MLS/HR: 80; 16 INJECTION, SOLUTION, CONCENTRATE INTRAVENOUS at 06:01

## 2018-10-12 RX ADMIN — POTASSIUM CHLORIDE SCH MEQ: 20 TABLET, EXTENDED RELEASE ORAL at 09:28

## 2018-10-12 RX ADMIN — IPRATROPIUM BROMIDE AND ALBUTEROL SULFATE SCH ML: .5; 3 SOLUTION RESPIRATORY (INHALATION) at 14:09

## 2018-10-12 RX ADMIN — SULFAMETHOXAZOLE AND TRIMETHOPRIM SCH MLS/HR: 80; 16 INJECTION, SOLUTION, CONCENTRATE INTRAVENOUS at 14:38

## 2018-10-12 RX ADMIN — METHYLPREDNISOLONE SODIUM SUCCINATE SCH MG: 40 INJECTION, POWDER, FOR SOLUTION INTRAMUSCULAR; INTRAVENOUS at 06:02

## 2018-10-12 RX ADMIN — POTASSIUM CHLORIDE SCH MEQ: 20 TABLET, EXTENDED RELEASE ORAL at 17:30

## 2018-10-12 RX ADMIN — CALCIUM CARBONATE-CHOLECALCIFEROL TAB 250 MG-125 UNIT SCH TAB: 250-125 TAB at 09:28

## 2018-10-12 RX ADMIN — IPRATROPIUM BROMIDE AND ALBUTEROL SULFATE SCH ML: .5; 3 SOLUTION RESPIRATORY (INHALATION) at 01:08

## 2018-10-12 RX ADMIN — IPRATROPIUM BROMIDE AND ALBUTEROL SULFATE SCH ML: .5; 3 SOLUTION RESPIRATORY (INHALATION) at 19:34

## 2018-10-12 RX ADMIN — MEROPENEM SCH MLS/HR: 1 INJECTION INTRAVENOUS at 21:39

## 2018-10-12 RX ADMIN — INSULIN LISPRO SCH: 100 INJECTION, SOLUTION INTRAVENOUS; SUBCUTANEOUS at 12:31

## 2018-10-12 RX ADMIN — INSULIN LISPRO SCH: 100 INJECTION, SOLUTION INTRAVENOUS; SUBCUTANEOUS at 08:23

## 2018-10-12 RX ADMIN — Medication SCH MG: at 10:25

## 2018-10-12 RX ADMIN — Medication SCH MG: at 17:30

## 2018-10-12 NOTE — PN
DATE:  10/12/2018



PULMONARY PROGRESS NOTE



SUBJECTIVE:  The patient was seen and examined at the bedside.  He is

receiving currently Aerosol therapy with DuoNeb and he is on oxygen by

nasal cannula.  The patient is on intravenous antibiotics, meropenem and

azithromycin as well as Solu-Medrol intravenously.



PHYSICAL EXAMINATION:

VITAL SIGNS:  Temperature is 97.9, pulse 88, respirations 18, pule oximetry

is 97 on nasal cannula, blood pressure is 117/71.

HEENT:  Head, ears, nose and throat is within normal limits.

NECK:  Supple with no jugular vein distentions.

CARDIOVASCULAR:  S1, S2.  No S3.  Regular.

PULMONARY:  Diminished breath sounds bilaterally with diffuse rhonchi and

few expiratory wheezes.

GASTROINTESTINAL:  Soft, nontender.  No organomegaly.

EXTREMITIES:  No pedal edema.

SKIN:  No acute skin rash.

NEUROLOGIC:  No focal deficits.



LABORATORY DATA:  Reviewed.  Today's WBC is 9.5, hemoglobin of 13.5.  Liver

function tests are within normal limits.



ASSESSMENT:

1.  Severe chronic obstructive pulmonary disease with exacerbation.

2.  Status post hypoxic respiratory failure.

3.  Advanced lung cancer.

4.  Resolving bronchospasm.



PLAN:  The patient appears to be improving with current therapy.  He is

still on moderate dose of intravenous steroids.  He is also on nebulizer

treatment and double antibiotic therapy for right lower lobe pneumonia.  We

will continue with current therapy and follow closely.





__________________________________________

Reji Villagran MD





DD:  10/12/2018 9:02:52

DT:  10/12/2018 9:23:28

Job # [id number]

## 2018-10-12 NOTE — CP.PCM.PN
Subjective





- Date & Time of Evaluation


Date of Evaluation: 10/11/18


Time of Evaluation: 12:00





- Subjective


Subjective: 





Alert, states he is feeling better today





Objective





- Vital Signs/Intake and Output


Vital Signs (last 24 hours): 


                                        











Temp Pulse Resp BP Pulse Ox


 


 97.9 F   88   26 H  117/71   97 


 


 10/12/18 06:00  10/12/18 06:00  10/12/18 09:57  10/12/18 06:00  10/12/18 06:00








Intake and Output: 


                                        











 10/12/18 10/12/18





 06:59 18:59


 


Intake Total 2260 


 


Output Total 2000 


 


Balance 260 














- Medications


Medications: 


                               Current Medications





Acetaminophen (Tylenol 325mg Tab)  650 mg PO Q6H PRN


   PRN Reason: Fever >100.4 F


   Last Admin: 10/08/18 17:42 Dose:  650 mg


Albuterol/Ipratropium (Duoneb 3 Mg/0.5 Mg (3 Ml) Ud)  3 ml IH [id number] [facility]


   Last Admin: 10/12/18 01:08 Dose:  3 ml


Albuterol/Ipratropium (Duoneb 3 Mg/0.5 Mg (3 Ml) Ud)  3 ml IH Q2H PRN


   PRN Reason: Shortness of Breath


Azithromycin (Zithromax)  500 mg PO DAILY [facility]; Protocol


   Last Admin: 10/12/18 09:28 Dose:  500 mg


Calcium/Vitamin D (Oscal-D 250 Mg-125 Units Tab)  2 tab PO DAILY [facility]


   Last Admin: 10/12/18 09:28 Dose:  2 tab


Diazepam (Valium)  5 mg PO HS [facility]; Protocol


   Last Admin: 10/11/18 21:51 Dose:  5 mg


Efavirenz/Emtricitabine/Tenofovir (Atripla 600 Mg-200 Mg-300 Mg)  1 tab PO HS 

[facility]; Protocol


   Last Admin: 10/11/18 21:52 Dose:  1 tab


Heparin Sodium (Porcine) (Heparin)  5,000 units SC Q8 [facility]; Protocol


   Last Admin: 10/12/18 06:03 Dose:  5,000 units


Sodium Chloride (Sodium Chloride 0.9%)  1,000 mls @ 100 mls/hr IV .Q10H [facility]


   Last Admin: 10/12/18 09:11 Dose:  100 mls/hr


Meropenem (Merrem Iv 1 Gm Premix)  1 gm in 50 mls @ 100 mls/hr IVPB Q8 [facility]; 

Protocol


   Last Admin: 10/12/18 06:02 Dose:  100 mls/hr


Trimethoprim/Sulfamethoxazole (370 mg/ Dextrose)  500 mls @ 250 mls/hr IVPB Q8 

[facility]


   Last Admin: 10/12/18 06:01 Dose:  250 mls/hr


Insulin Human Lispro (Humalog Low)  0 units SC ACHS [facility]; Protocol


   Last Admin: 10/12/18 08:23 Dose:  Not Given


Magnesium Oxide (Mag-Ox)  400 mg PO BID [facility]


   Last Admin: 10/12/18 10:25 Dose:  400 mg


Methylprednisolone (Solu-Medrol)  40 mg IVP Q8H [facility]


   Last Admin: 10/12/18 06:02 Dose:  40 mg


Pantoprazole Sodium (Protonix Ec Tab)  40 mg PO ACB [facility]


   Last Admin: 10/12/18 08:28 Dose:  40 mg


Potassium Chloride (K-Dur 20 Meq Er Tab)  20 meq PO BID [facility]


   Last Admin: 10/12/18 09:28 Dose:  20 meq











- Labs


Labs: 


                                        





                                 10/12/18 06:30 





                                 10/12/18 06:30 











- Constitutional


Appears: Cachectic, Chronically Ill





- Head Exam


Head Exam: NORMAL INSPECTION





- Eye Exam


Eye Exam: Normal appearance, PERRL





- ENT Exam


ENT Exam: Mucous Membranes Moist





- Respiratory Exam


Respiratory Exam: Accessory Muscle Use, Decreased Breath Sounds, Wheezes





- Cardiovascular Exam


Cardiovascular Exam: REGULAR RHYTHM, +S1, +S2





- GI/Abdominal Exam


GI & Abdominal Exam: Soft, Normal Bowel Sounds





- Extremities Exam


Extremities Exam: Full ROM, Normal Capillary Refill





- Neurological Exam


Neurological Exam: Alert, Oriented x3





- Skin


Skin Exam: Dry, Pallor





Assessment and Plan





- Assessment and Plan (Free Text)


Assessment: 





68 year old male with hsity of lung cancer s/p left penumonectomy, HIV, HTRN who

is admitted with RLL pneumonia, squamous cell lung cancer right lung, dyspnea, 

weakness, shortness of breath.





 Jesus and I reviewed wishes regarding advance cqre planning. He states he wants 

to be DNR/DNI and that he spoke with his ex wife Bonnie. She is is designated POA

and is in agreement with his wishes. The patient intends to continue  


Plan: 





[place] of care and advance care planning





RLL pneumonia: Continue Bactrim, Merrem, Zithromax. ID recs appreciated.





Pulmoadry: Dr Cora messer , continue duo nebs, O2  





Deconditioning: PT/OT





Squamous Cell Lung Cancer: Patiet is under Dr Reynoso's care and will  follow up 

as OP

## 2018-10-12 NOTE — CP.PCM.PN
Subjective





- Date & Time of Evaluation


Date of Evaluation: 10/12/18


Time of Evaluation: 12:00





- Subjective


Subjective: 





Breathing a little better, no fevers, but still with cough although a little 

less. No fevers.





Objective





- Vital Signs/Intake and Output


Vital Signs (last 24 hours): 


                                        











Temp Pulse Resp BP Pulse Ox


 


 98.2 F   96 H  22   115/87   96 


 


 10/12/18 18:00  10/12/18 18:00  10/12/18 18:00  10/12/18 18:00  10/12/18 09:00








Intake and Output: 


                                        











 10/12/18 10/13/18





 18:59 06:59


 


Intake Total 1580 


 


Output Total 750 


 


Balance 830 














- Medications


Medications: 


                               Current Medications





Acetaminophen (Tylenol 325mg Tab)  650 mg PO Q6H PRN


   PRN Reason: Fever >100.4 F


   Last Admin: 10/08/18 17:42 Dose:  650 mg


Albuterol/Ipratropium (Duoneb 3 Mg/0.5 Mg (3 Ml) Ud)  3 ml IH [id number] [facility]


   Last Admin: 10/12/18 19:34 Dose:  3 ml


Albuterol/Ipratropium (Duoneb 3 Mg/0.5 Mg (3 Ml) Ud)  3 ml IH Q2H PRN


   PRN Reason: Shortness of Breath


Azithromycin (Zithromax)  500 mg PO DAILY [facility]; Protocol


   Last Admin: 10/12/18 09:28 Dose:  500 mg


Calcium/Vitamin D (Oscal-D 250 Mg-125 Units Tab)  2 tab PO DAILY [facility]


   Last Admin: 10/12/18 09:28 Dose:  2 tab


Diazepam (Valium)  5 mg PO HS [facility]; Protocol


   Last Admin: 10/11/18 21:51 Dose:  5 mg


Efavirenz/Emtricitabine/Tenofovir (Atripla 600 Mg-200 Mg-300 Mg)  1 tab PO HS 

[facility]; Protocol


   Last Admin: 10/11/18 21:52 Dose:  1 tab


Heparin Sodium (Porcine) (Heparin)  5,000 units SC Q8 [facility]; Protocol


   Last Admin: 10/12/18 14:37 Dose:  5,000 units


Sodium Chloride (Sodium Chloride 0.9%)  1,000 mls @ 100 mls/hr IV .Q10H [facility]


   Last Admin: 10/12/18 09:11 Dose:  100 mls/hr


Meropenem (Merrem Iv 1 Gm Premix)  1 gm in 50 mls @ 100 mls/hr IVPB Q8 [facility]; 

Protocol


   Last Admin: 10/12/18 14:38 Dose:  100 mls/hr


Insulin Human Lispro (Humalog Low)  0 units SC ACHS [facility]; Protocol


   Last Admin: 10/12/18 16:54 Dose:  Not Given


Magnesium Oxide (Mag-Ox)  400 mg PO BID [facility]


   Last Admin: 10/12/18 17:30 Dose:  400 mg


Methylprednisolone (Solu-Medrol)  40 mg IVP Q8H [facility]


   Last Admin: 10/12/18 14:37 Dose:  40 mg


Pantoprazole Sodium (Protonix Ec Tab)  40 mg PO ACB [facility]


   Last Admin: 10/12/18 08:28 Dose:  40 mg


Potassium Chloride (K-Dur 20 Meq Er Tab)  20 meq PO BID [facility]


   Last Admin: 10/12/18 17:30 Dose:  20 meq











- Labs


Labs: 


                                        





                                 10/12/18 06:30 





                                 10/12/18 06:30 











- Constitutional


Appears: Chronically Ill





- Head Exam


Head Exam: NORMAL INSPECTION





- ENT Exam


ENT Exam: Mucous Membranes Moist





- Neck Exam


Neck Exam: absent: Meningismus





- Respiratory Exam


Respiratory Exam: Decreased Breath Sounds





- Cardiovascular Exam


Cardiovascular Exam: +S1, +S2





- GI/Abdominal Exam


GI & Abdominal Exam: Soft.  absent: Tenderness





Assessment and Plan





- Assessment and Plan (Free Text)


Plan: 





Assessment


Severe sepsis with acute hypoxic respiratory failure due to right lower lobe 

HCAP, unlikely Pneumocystis pneumonia in this patient with chronic HIV infection


metastatic lung cancer (S/P left lung resection, but now has a newly discovered 

right lung mass)


HTN





Plan


on Bactrim IV, Merrem and Zithromax (day 5); LDH is normal and Fungitell is 

negative, making pneumocystis pneumonia unlikely - will d/c Bactrim - complete 

up to 7 days of antibiotics


MRSA nares is negative


CD4 count is 318 and HIV VL is 1.36 - patient is taking Atripla and he may need 

adjustment of his ART regimen as an outpatient


follow up further plans of Oncology regarding new right lung mass


will continue to monitor clinically

## 2018-10-12 NOTE — CP.PCM.CON
History of Present Illness





- History of Present Illness


History of Present Illness: 


68 yr old male w/ hx of Lung ca s/p pneumonectomy now has recurrence in the 

contralateral lung inoperable currently on home oxygen, HTN, HIV p/w SOB. 

Patient is admitted for acute shortness of breath due to his disease. Patient 

states that he was having hemoptysis and worsening SOB and his wife took him to 

the hospital. Patient currently has been on high flow oxygen and is currently 

being weaned off. States that with the steroids, antibiotics and high flow 

oxygen he is more comfortable. No chest pain, fevers, chills, nausea, vomiting, 

or other complaints. He is currently awaiting to start chemotherapy with 

immunotherapy at the office pending his discharge. 





PMD: Howard


[place] [place]


[place]c: [place] [place]








Review of Systems





- Constitutional


Constitutional: As Per HPI





- EENT


Eyes: As Per HPI


Ears: As Per HPI


Nose/Mouth/Throat: As Per HPI





- Cardiovascular


Cardiovascular: As Per HPI





- Respiratory


Respiratory: As Per HPI





- Gastrointestinal


Gastrointestinal: As Per HPI





- Genitourinary


Genitourinary: As Per HPI





- Reproductive: Male


Reproductive:Male: As Per HPI





- Musculoskeletal


Musculoskeletal: As Per HPI





- Integumentary


Integumentary: As Per HPI





- Neurological


Neurological: As Per HPI





- Psychiatric


Psychiatric: As Per HPI





- Endocrine


Endocrine: As Per HPI





Past Patient History





- Infectious Disease


Hx of Infectious Diseases: None





- Past Social History


Smoking Status: Unknown If Ever Smoked





- CARDIAC


Hx Pacemaker: No





- PULMONARY


Hx Lung Cancer: Yes


Other/Comment: Left pneumonectomy.  Mass noted to the Rt. lung





- NEUROLOGICAL


Hx Neurological Disorder: No





- HEENT


Hx HEENT Problems: Yes (eyeglasses)





- RENAL


Hx Chronic Kidney Disease: No





- ENDOCRINE/METABOLIC


Hx Endocrine Disorders: No





- HEMATOLOGICAL/ONCOLOGICAL


Hx Blood Transfusions: No





- INTEGUMENTARY


Hx Dermatological Problems: Yes


Other/Comment: multiple tatoos





- MUSCULOSKELETAL/RHEUMATOLOGICAL


Hx Musculoskeletal Disorders: No





- GASTROINTESTINAL


Hx Gastrointestinal Disorders: Yes (weight loss, appetite good)





- GENITOURINARY/GYNECOLOGICAL


Hx Genitourinary Disorders: No





- PSYCHIATRIC


Hx Emotional Abuse: No


Hx Physical Abuse: No





- SURGICAL HISTORY


Hx Surgeries: Yes (LEFT PNEUMONECTOMY, BILATERAL MENISCUS SURGERY)





- ANESTHESIA


Hx Anesthesia Reactions: No





Meds


Allergies/Adverse Reactions: 


                                    Allergies











Allergy/AdvReac Type Severity Reaction Status Date / Time


 


No Known Allergies Allergy   Verified 10/08/18 07:29














- Medications


Medications: 


                               Current Medications





Acetaminophen (Tylenol 325mg Tab)  650 mg PO Q6H PRN


   PRN Reason: Fever >100.4 F


   Last Admin: 10/08/18 17:42 Dose:  650 mg


Albuterol/Ipratropium (Duoneb 3 Mg/0.5 Mg (3 Ml) Ud)  3 ml IH [id number] [facility]


   Last Admin: 10/12/18 08:10 Dose:  3 ml


Albuterol/Ipratropium (Duoneb 3 Mg/0.5 Mg (3 Ml) Ud)  3 ml IH Q2H PRN


   PRN Reason: Shortness of Breath


Azithromycin (Zithromax)  500 mg PO DAILY [facility]; Protocol


   Last Admin: 10/12/18 09:28 Dose:  500 mg


Calcium/Vitamin D (Oscal-D 250 Mg-125 Units Tab)  2 tab PO DAILY [facility]


   Last Admin: 10/12/18 09:28 Dose:  2 tab


Diazepam (Valium)  5 mg PO HS [facility]; Protocol


   Last Admin: 10/11/18 21:51 Dose:  5 mg


Efavirenz/Emtricitabine/Tenofovir (Atripla 600 Mg-200 Mg-300 Mg)  1 tab PO HS 

[facility]; Protocol


   Last Admin: 10/11/18 21:52 Dose:  1 tab


Heparin Sodium (Porcine) (Heparin)  5,000 units SC Q8 [facility]; Protocol


   Last Admin: 10/12/18 14:37 Dose:  5,000 units


Sodium Chloride (Sodium Chloride 0.9%)  1,000 mls @ 100 mls/hr IV .Q10H [facility]


   Last Admin: 10/12/18 09:11 Dose:  100 mls/hr


Meropenem (Merrem Iv 1 Gm Premix)  1 gm in 50 mls @ 100 mls/hr IVPB Q8 [facility]; 

Protocol


   Last Admin: 10/12/18 14:38 Dose:  100 mls/hr


Insulin Human Lispro (Humalog Low)  0 units SC ACHS [facility]; Protocol


   Last Admin: 10/12/18 12:31 Dose:  Not Given


Magnesium Oxide (Mag-Ox)  400 mg PO BID [facility]


   Last Admin: 10/12/18 10:25 Dose:  400 mg


Methylprednisolone (Solu-Medrol)  40 mg IVP Q8H [facility]


   Last Admin: 10/12/18 14:37 Dose:  40 mg


Pantoprazole Sodium (Protonix Ec Tab)  40 mg PO ACB [facility]


   Last Admin: 10/12/18 08:28 Dose:  40 mg


Potassium Chloride (K-Dur 20 Meq Er Tab)  20 meq PO BID [facility]


   Last Admin: 10/12/18 09:28 Dose:  20 meq











Physical Exam





- Constitutional


Appears: In Acute Distress, Chronically Ill





- Head Exam


Head Exam: ATRAUMATIC





- Eye Exam


Eye Exam: EOMI, Normal appearance


Pupil Exam: NORMAL ACCOMODATION





- Neck Exam


Neck exam: Positive for: Full Rom, Normal Inspection.  Negative for: 

Lymphadenopathy, Thyromegaly





- Respiratory Exam


Respiratory Exam: Decreased Breath Sounds.  absent: Clear to Auscultation 

Bilateral





- Cardiovascular Exam


Cardiovascular Exam: REGULAR RHYTHM, +S1, +S2





- GI/Abdominal Exam


GI & Abdominal Exam: Normal Bowel Sounds





- Rectal Exam


Rectal Exam: Deferred





- Extremities Exam


Extremities exam: Positive for: full ROM.  Negative for: joint swelling





- Back Exam


Back exam: FULL ROM, NORMAL INSPECTION





- Neurological Exam


Neurological exam: Alert, CN II-XII Intact, Oriented x3





- Skin


Skin Exam: Normal Color


Additional comments: 





multiple tattoos noted 





Results





- Vital Signs


Recent Vital Signs: 


                                Last Vital Signs











Temp  97.6 F   10/12/18 12:00


 


Pulse  102 H  10/12/18 14:00


 


Resp  18   10/12/18 12:00


 


BP  121/84   10/12/18 12:00


 


Pulse Ox  96   10/12/18 09:00














- Labs


Result Diagrams: 


                                 10/12/18 06:30





                                 10/12/18 06:30


Labs: 


                         Laboratory Results - last 24 hr











  10/11/18 10/11/18 10/12/18





  16:37 21:32 06:30


 


WBC    9.5


 


RBC    5.92


 


Hgb    13.5 L D


 


Hct    42.3


 


MCV    71.5 L


 


MCH    22.8 L


 


MCHC    31.9


 


RDW    14.0


 


Plt Count    200


 


MPV    10.7


 


Gran %    78.3 H


 


Lymph % (Auto)    15.2 L


 


Mono % (Auto)    6.5 H


 


Eos % (Auto)    0.0 L


 


Baso % (Auto)    0.0


 


Gran #    7.44 H


 


Lymph # (Auto)    1.5


 


Mono # (Auto)    0.6


 


Eos # (Auto)    0.0


 


Baso # (Auto)    0.00


 


Sodium   


 


Potassium   


 


Chloride   


 


Carbon Dioxide   


 


Anion Gap   


 


BUN   


 


Creatinine   


 


Est GFR ( Amer)   


 


Est GFR (Non-Af Amer)   


 


POC Glucose (mg/dL)  138 H  139 H 


 


Random Glucose   


 


Calcium   


 


Total Bilirubin   


 


AST   


 


ALT   


 


Alkaline Phosphatase   


 


Total Protein   


 


Albumin   


 


Globulin   


 


Albumin/Globulin Ratio   














  10/12/18 10/12/18 10/12/18





  06:30 07:44 11:34


 


WBC   


 


RBC   


 


Hgb   


 


Hct   


 


MCV   


 


MCH   


 


MCHC   


 


RDW   


 


Plt Count   


 


MPV   


 


Gran %   


 


Lymph % (Auto)   


 


Mono % (Auto)   


 


Eos % (Auto)   


 


Baso % (Auto)   


 


Gran #   


 


Lymph # (Auto)   


 


Mono # (Auto)   


 


Eos # (Auto)   


 


Baso # (Auto)   


 


Sodium  136  


 


Potassium  4.5  


 


Chloride  104  


 


Carbon Dioxide  25  


 


Anion Gap  12  


 


BUN  23 H  


 


Creatinine  1.0  


 


Est GFR ( Amer)  > 60  


 


Est GFR (Non-Af Amer)  > 60  


 


POC Glucose (mg/dL)   144 H  128 H


 


Random Glucose  113 H  


 


Calcium  8.1 L  


 


Total Bilirubin  0.4  


 


AST  57  


 


ALT  78 H  


 


Alkaline Phosphatase  77  


 


Total Protein  6.4  


 


Albumin  3.1  


 


Globulin  3.3  


 


Albumin/Globulin Ratio  1.0 L  














Assessment & Plan





- Assessment and Plan (Free Text)


Assessment: 


68 year old male patient with history of left pneumonectomy in 2014 for lung 

cancer now found to have recurrence in the contralateral lung is admitted for 

shortness of breath and hemoptysis. At this time his condition has improved with

abx, steroids and high flow oxygen and patients is being weaned off high flow to

regular oxygen. Once pateint is stable for dischrage he will start chemotherapy 

along with immunotherapy for his reccurence. 





Plan


Continue high flow oxygen and wean accordingly


IV antibiotics 


IV hydration


Adequate pain control


Patient will follow up with Dr. Reynoso for chemotherapy at the office upon 

discharge. 





Thank you for allowing me to partake in your patients care. 





Sincerely, 





Viktor Pan


(covering for Dr. Reynoso)

## 2018-10-13 LAB
ALBUMIN SERPL-MCNC: 3.4 G/DL (ref 3–4.8)
ALBUMIN/GLOB SERPL: 1 {RATIO} (ref 1.1–1.8)
ALT SERPL-CCNC: 78 U/L (ref 7–56)
AST SERPL-CCNC: 50 U/L (ref 17–59)
BASOPHILS # BLD AUTO: 0.01 K/MM3 (ref 0–2)
BASOPHILS NFR BLD: 0.1 % (ref 0–3)
BUN SERPL-MCNC: 28 MG/DL (ref 7–21)
CALCIUM SERPL-MCNC: 8.7 MG/DL (ref 8.4–10.5)
EOSINOPHIL # BLD: 0 10*3/UL (ref 0–0.7)
EOSINOPHIL NFR BLD: 0.1 % (ref 1.5–5)
ERYTHROCYTE [DISTWIDTH] IN BLOOD BY AUTOMATED COUNT: 14.2 % (ref 11.5–14.5)
GFR NON-AFRICAN AMERICAN: > 60
GRANULOCYTES # BLD: 7.59 10*3/UL (ref 1.4–6.5)
GRANULOCYTES NFR BLD: 74.7 % (ref 50–68)
HGB BLD-MCNC: 14.6 G/DL (ref 14–18)
LYMPHOCYTES # BLD: 1.7 10*3/UL (ref 1.2–3.4)
LYMPHOCYTES NFR BLD AUTO: 16.2 % (ref 22–35)
MCH RBC QN AUTO: 23.1 PG (ref 25–35)
MCHC RBC AUTO-ENTMCNC: 32 G/DL (ref 31–37)
MCV RBC AUTO: 72.2 FL (ref 80–105)
MONOCYTES # BLD AUTO: 0.9 10*3/UL (ref 0.1–0.6)
MONOCYTES NFR BLD: 8.9 % (ref 1–6)
PLATELET # BLD: 215 10^3/UL (ref 120–450)
PMV BLD AUTO: 10.6 FL (ref 7–11)
RBC # BLD AUTO: 6.32 10^6/UL (ref 3.5–6.1)
WBC # BLD AUTO: 10.2 10^3/UL (ref 4.5–11)

## 2018-10-13 RX ADMIN — INSULIN LISPRO SCH: 100 INJECTION, SOLUTION INTRAVENOUS; SUBCUTANEOUS at 21:37

## 2018-10-13 RX ADMIN — METHYLPREDNISOLONE SODIUM SUCCINATE SCH MG: 40 INJECTION, POWDER, FOR SOLUTION INTRAMUSCULAR; INTRAVENOUS at 06:47

## 2018-10-13 RX ADMIN — Medication SCH MG: at 17:37

## 2018-10-13 RX ADMIN — MEROPENEM SCH MLS/HR: 1 INJECTION INTRAVENOUS at 06:47

## 2018-10-13 RX ADMIN — INSULIN LISPRO SCH: 100 INJECTION, SOLUTION INTRAVENOUS; SUBCUTANEOUS at 07:46

## 2018-10-13 RX ADMIN — IPRATROPIUM BROMIDE AND ALBUTEROL SULFATE SCH ML: .5; 3 SOLUTION RESPIRATORY (INHALATION) at 08:28

## 2018-10-13 RX ADMIN — IPRATROPIUM BROMIDE AND ALBUTEROL SULFATE SCH ML: .5; 3 SOLUTION RESPIRATORY (INHALATION) at 01:33

## 2018-10-13 RX ADMIN — CALCIUM CARBONATE-CHOLECALCIFEROL TAB 250 MG-125 UNIT SCH TAB: 250-125 TAB at 09:40

## 2018-10-13 RX ADMIN — IPRATROPIUM BROMIDE AND ALBUTEROL SULFATE SCH ML: .5; 3 SOLUTION RESPIRATORY (INHALATION) at 14:03

## 2018-10-13 RX ADMIN — POTASSIUM CHLORIDE SCH MEQ: 20 TABLET, EXTENDED RELEASE ORAL at 09:40

## 2018-10-13 RX ADMIN — METHYLPREDNISOLONE SODIUM SUCCINATE SCH MG: 40 INJECTION, POWDER, FOR SOLUTION INTRAMUSCULAR; INTRAVENOUS at 21:29

## 2018-10-13 RX ADMIN — PANTOPRAZOLE SODIUM SCH MG: 40 TABLET, DELAYED RELEASE ORAL at 06:48

## 2018-10-13 RX ADMIN — EFAVIRENZ, EMTRICITABINE, AND TENOFOVIR DISOPROXIL FUMARATE SCH TAB: 600; 200; 300 TABLET, FILM COATED ORAL at 22:19

## 2018-10-13 RX ADMIN — INSULIN LISPRO SCH: 100 INJECTION, SOLUTION INTRAVENOUS; SUBCUTANEOUS at 17:35

## 2018-10-13 RX ADMIN — Medication SCH MG: at 09:40

## 2018-10-13 RX ADMIN — MEROPENEM SCH: 1 INJECTION INTRAVENOUS at 14:42

## 2018-10-13 RX ADMIN — INSULIN LISPRO SCH: 100 INJECTION, SOLUTION INTRAVENOUS; SUBCUTANEOUS at 11:38

## 2018-10-13 RX ADMIN — IPRATROPIUM BROMIDE AND ALBUTEROL SULFATE SCH ML: .5; 3 SOLUTION RESPIRATORY (INHALATION) at 20:30

## 2018-10-13 RX ADMIN — POTASSIUM CHLORIDE SCH: 20 TABLET, EXTENDED RELEASE ORAL at 09:42

## 2018-10-13 NOTE — PN
DATE:  10/13/2018



PULMONARY PROGRESS NOTE



SUBJECTIVE:  Mr. Mejia is sitting in bed, feeling more comfortable.  He is

verbal.  He states that he is markedly improved.  He is watching his pulse

ox numbers go up and is very happy and excited.  His oxygen saturation at

this time is 96%.  He is elated.  He states he feels better without

significant shortness of breath.



The patient continues on his current medication including inhaled

bronchodilators and corticosteroids.  He is on vigorous antibiotic therapy.



PHYSICAL EXAMINATION:  As described previously,

VITAL SIGNS:  Stable.  He remains afebrile.  Pulse is 84, respiratory rate

18, O2 sat 97% on nasal cannula, blood pressure 120/70.

HEENT:  Normocephalic, atraumatic.

NECK:  Supple.  No JVD.  No lymphadenopathy.  No bruit.

CARDIOVASCULAR:  Regular rhythm.  S1, S2 without murmur, gallop, or rub.

CHEST:  Global decrease in breath sounds.  Minimal rhonchi throughout. 

Minimal expiratory wheezes throughout.  No change.

ABDOMEN:  Soft.  Bowel sounds normoactive without mass, guarding, rebound,

or organomegaly.

EXTREMITIES:  No clubbing, cyanosis, or edema.

SKIN:  No rash or excoriation.

NEUROLOGIC:  Awake, alert, oriented.  No focal findings.



LABORATORY DATA:  Chest x-ray shows no real change in pulmonary vascular

congestion or pneumonia.



Laboratory studies noted.



ASSESSMENT:

1.  Severe chronic obstructive pulmonary disease.

2.  Acute bronchospasm, in resolution.

3.  Status post hypoxic respiratory failure.

4.  Advanced lung carcinoma.

5.  Pulmonary vascular congestion.



PLAN:  Continue vigorous medical therapy.  Continue inhalation therapy, try

to decrease corticosteroids at the earliest possible time.  Continue

antibiotics as per Infectious Disease.  We will discuss with primary

medical doctor, Dr. Lawrence, as well as other consultants and decide on

the need for further intervention.  The patient is clinically improved. 

Will follow closely with you.







__________________________________________

Cisco Najera MD



DD:  10/13/2018 10:28:28

DT:  10/13/2018 10:30:10

Job # [id number]

## 2018-10-13 NOTE — PN
DATE:  10/13/2018



SUBJECTIVE:  The patient is in bed, in no acute distress, nontoxic, no

fever.  The patient was seen earlier today.  No fevers, no chills, no chest

pain.



PHYSICAL EXAMINATION:

VITAL SIGNS:  On exam, temperature is 98, blood pressure is 129/80,

respiratory rate of 18, heart rate of 87, saturating at 93%.

HEENT:  Examination of HEENT is unremarkable.

NECK:  Supple.

LUNGS:  Have decreased breath sounds.

HEART:  Normal S1, S2.

ABDOMEN:  Soft, nontender.



LABORATORY DATA:  Laboratory examination reveals a white count of 10,000,

hemoglobin of 14, platelets of 215.  BUN of 28, creatinine of 1.2. 

Procalcitonin is less than 0.05.  CD-4 count of 27% with 318 absolute

count.  The patient's HIV is 1.36 PCR.  Urine for Legionella antigen is

negative.  Beta-1, 3 glucan is negative.  Microbiology reveals yeast in the

sputum.  The nasal MRSA is not positive.  The blood cultures are no growth.

Review of orders reveals the patient to be on Atripla, meropenem,

prednisone.  The patient's procalcitonin is less than 0.05.  Chest x-ray

from 10/10/2018:  Chronic left-sided opacification, right lung and right

lung mass.  Dr. Najera's note from today is reviewed.  He states the

patient has chronic obstructive lung disease and acute bronchospasm, status

post hypoxic respiratory failure, advanced lung carcinoma and pulmonary

vascular congestion.



ASSESSMENT AND PLAN:  A 68-year-old male with severe sepsis, acute hypoxic

respiratory failure, right lower lobe healthcare-associated pneumonia in a

patient with human immunodeficiency virus and metastatic lung cancer,

status post lung resection, but now newly discovered right lung mass and

hypertension, on meropenem.  Since the LDH was normal and the Fungitell was

negative, Dr. Cuellar discontinued the Bactrim.  Today is day #6 of meropenem

and Zithromax.  ____ too high for Pneumocystis carinii pneumonia.  Also on

Atripla.  The Zithromax was discontinued.  Currently, only on meropenem. 

Review of EKG reveals a QTc of 438.  Maybe able to use p.o. Levaquin to

complete therapy.  The patient also had an echocardiogram, which showed

ejection fraction of 49.9%.  We will follow with you.





__________________________________________

Sang Whitlock MD





DD:  10/13/2018 13:48:48

DT:  10/13/2018 13:52:27

[facility] # [id number]

## 2018-10-14 LAB
ALBUMIN SERPL-MCNC: 3.2 G/DL (ref 3–4.8)
ALBUMIN/GLOB SERPL: 1 {RATIO} (ref 1.1–1.8)
ALT SERPL-CCNC: 69 U/L (ref 7–56)
AST SERPL-CCNC: 37 U/L (ref 17–59)
BASOPHILS # BLD AUTO: 0 K/MM3 (ref 0–2)
BASOPHILS NFR BLD: 0 % (ref 0–3)
BUN SERPL-MCNC: 37 MG/DL (ref 7–21)
CALCIUM SERPL-MCNC: 8.7 MG/DL (ref 8.4–10.5)
EOSINOPHIL # BLD: 0 10*3/UL (ref 0–0.7)
EOSINOPHIL NFR BLD: 0.1 % (ref 1.5–5)
ERYTHROCYTE [DISTWIDTH] IN BLOOD BY AUTOMATED COUNT: 14.2 % (ref 11.5–14.5)
GFR NON-AFRICAN AMERICAN: > 60
GRANULOCYTES # BLD: 6.67 10*3/UL (ref 1.4–6.5)
GRANULOCYTES NFR BLD: 75.5 % (ref 50–68)
HGB BLD-MCNC: 14.6 G/DL (ref 14–18)
LYMPHOCYTES # BLD: 1.6 10*3/UL (ref 1.2–3.4)
LYMPHOCYTES NFR BLD AUTO: 17.6 % (ref 22–35)
MCH RBC QN AUTO: 23.6 PG (ref 25–35)
MCHC RBC AUTO-ENTMCNC: 32.5 G/DL (ref 31–37)
MCV RBC AUTO: 72.7 FL (ref 80–105)
MONOCYTES # BLD AUTO: 0.6 10*3/UL (ref 0.1–0.6)
MONOCYTES NFR BLD: 6.8 % (ref 1–6)
PLATELET # BLD: 212 10^3/UL (ref 120–450)
PMV BLD AUTO: 10.9 FL (ref 7–11)
RBC # BLD AUTO: 6.18 10^6/UL (ref 3.5–6.1)
WBC # BLD AUTO: 8.8 10^3/UL (ref 4.5–11)

## 2018-10-14 RX ADMIN — EFAVIRENZ, EMTRICITABINE, AND TENOFOVIR DISOPROXIL FUMARATE SCH TAB: 600; 200; 300 TABLET, FILM COATED ORAL at 21:00

## 2018-10-14 RX ADMIN — IPRATROPIUM BROMIDE AND ALBUTEROL SULFATE SCH ML: .5; 3 SOLUTION RESPIRATORY (INHALATION) at 01:40

## 2018-10-14 RX ADMIN — PANTOPRAZOLE SODIUM SCH MG: 40 TABLET, DELAYED RELEASE ORAL at 08:46

## 2018-10-14 RX ADMIN — INSULIN LISPRO SCH: 100 INJECTION, SOLUTION INTRAVENOUS; SUBCUTANEOUS at 17:30

## 2018-10-14 RX ADMIN — INSULIN LISPRO SCH: 100 INJECTION, SOLUTION INTRAVENOUS; SUBCUTANEOUS at 21:48

## 2018-10-14 RX ADMIN — IPRATROPIUM BROMIDE AND ALBUTEROL SULFATE SCH ML: .5; 3 SOLUTION RESPIRATORY (INHALATION) at 19:30

## 2018-10-14 RX ADMIN — METHYLPREDNISOLONE SODIUM SUCCINATE SCH MG: 40 INJECTION, POWDER, FOR SOLUTION INTRAMUSCULAR; INTRAVENOUS at 21:00

## 2018-10-14 RX ADMIN — OXYCODONE HYDROCHLORIDE AND ACETAMINOPHEN PRN TAB: 5; 325 TABLET ORAL at 17:31

## 2018-10-14 RX ADMIN — Medication SCH MG: at 17:31

## 2018-10-14 RX ADMIN — INSULIN LISPRO SCH: 100 INJECTION, SOLUTION INTRAVENOUS; SUBCUTANEOUS at 08:23

## 2018-10-14 RX ADMIN — INSULIN LISPRO SCH: 100 INJECTION, SOLUTION INTRAVENOUS; SUBCUTANEOUS at 12:09

## 2018-10-14 RX ADMIN — IPRATROPIUM BROMIDE AND ALBUTEROL SULFATE SCH ML: .5; 3 SOLUTION RESPIRATORY (INHALATION) at 08:25

## 2018-10-14 RX ADMIN — CALCIUM CARBONATE-CHOLECALCIFEROL TAB 250 MG-125 UNIT SCH TAB: 250-125 TAB at 10:08

## 2018-10-14 RX ADMIN — IPRATROPIUM BROMIDE AND ALBUTEROL SULFATE SCH ML: .5; 3 SOLUTION RESPIRATORY (INHALATION) at 14:32

## 2018-10-14 RX ADMIN — Medication SCH MG: at 10:04

## 2018-10-14 RX ADMIN — METHYLPREDNISOLONE SODIUM SUCCINATE SCH MG: 40 INJECTION, POWDER, FOR SOLUTION INTRAMUSCULAR; INTRAVENOUS at 10:04

## 2018-10-14 NOTE — PN
DATE:  10/14/2018



SUBJECTIVE:  The patient is seen earlier today in room 269, bed 1.  No

fevers, no chills.  No nausea.  Comfortable.



PHYSICAL EXAMINATION:

VITAL SIGNS:  On exam, temperature is 98, blood pressure is 106/70,

respiratory rate 20, heart rate of 84.

HEENT:  Examination of HEENT is unremarkable.

NECK:  Supple.

LUNGS:  Have decreased breath sounds.

HEART:  Normal S1, S2.

ABDOMEN:  Soft, nontender.  No rebound or guarding.



LABORATORY DATA:  Laboratory examination reveals the white count is 8.8,

hemoglobin of 14, platelets of 212.  Chemistries are noted.  BUN of 37,

creatinine of 1.1.  Procalcitonin is less than 0.05 and immunology is noted

and CD-4 count of 27% with HIV PCR of 1.36 log.   Microbiology reveals

yeast in the sputum and nares MRSA screen is negative.  The blood cultures

are negative.  Review of orders reveals the patient to be on Atripla, which

requires renewal, which we will do so and the patient is on Solu-Medrol. 

Dr. Najera's note is reviewed.



ASSESSMENT AND PLAN:  A 68-year-old male was admitted with severe sepsis,

acute hypoxic respiratory failure, right lower lobe healthcare-associated

pneumonia in a patient with human immunodeficiency virus, metastatic lung

cancer and status post lung resection, now newly discovered right lung mass

and hypertension and day #7 of meropenem.  The patient on p.o. Zithromax,

was treated with Zithromax.  We will switch to p.o. Levaquin to complete

therapy.  Continue with the efavirenz, emtricitabine, and tenofovir

combination which is Atripla and meropenem has been discontinued and will

complete with p.o. Levaquin 500 mg once daily x5 days.  Follow the imaging

and workup as per Pulmonary, as per Dr. Najera.







__________________________________________

Sang Whitlock MD









DD:  10/14/2018 12:09:35

DT:  10/14/2018 12:11:50

Job # [id number]

## 2018-10-14 NOTE — PN
DATE:  10/14/2018



PULMONARY PROGRESS NOTE



SUBJECTIVE:  Mr. Mejia is markedly more comfortable.  He is anxious to

decrease his oxygen concentration delivered.  Dyspnea is improved.  He is

monitoring himself carefully to the extent that he is getting himself

anxious.  I have assured him that we will be monitoring him closely and

that he does not have to worry about caring for himself.  He states that he

has Dr. Reynoso and Dr. Lawrence's PA as the outpatient physicians.  I

explained to him that he needs to be seen by a pulmonologist for his

respiratory insufficiency once discharged.  I have given him our card and

asked him to see either Dr. Shepherd or myself upon discharge.



CURRENT MEDICATIONS:  Appear to be good.  We have decreased his

corticosteroids slowly and he is continuing on antibiotic therapy.



PHYSICAL EXAMINATION:

GENERAL:  The patient is resting comfortably.

VITAL SIGNS:  He is afebrile.  Respiratory rate 16, heart rate 80, oxygen

sat 97% on nasal cannula 50%, blood pressure 124/74.

HEENT:  Normocephalic, atraumatic.

NECK:  Supple.  No JVD.  There are no bruits.

CARDIOVASCULAR:  Regular rhythm.  No gallop or rub is appreciated.  S1, S2

are normal.

CHEST:  Decrease in breath sounds with minimal rhonchi persist.  There is

no wheezing appreciated compared to yesterday.

ABDOMEN:  Soft.  Bowel sounds normoactive without mass, guarding, or

rebound.  There is no organomegaly.

EXTREMITIES:  Show no clubbing or cyanosis.  There is no edema.  There is

no tenderness on the calf.

SKIN:  Shows no rash or excoriation.

NEUROLOGIC:  The patient is awake, alert, and oriented with no focal

findings.



LABORATORY DATA:  No new laboratory data is available today.



ASSESSMENT:

1.  Severe respiratory distress in resolution.

2.  Severe chronic obstructive pulmonary disease - stabilizing.

3.  Acute bronchospasm, in resolution.

4.  Status post hypoxic respiratory failure.

5.  Advanced lung carcinoma.

6.  Pulmonary vascular congestion.



PLAN:  Continue vigorous medical therapy with supplemental oxygen,

bronchodilators.  Taper corticosteroids as permitted.  Discuss with Dr. Lawrence and other consultants regarding future followup.  The patient

needs to be followed closely for COPD and hypoxemia.  The patient states

that he has oxygen at home.  We will discuss all avenues prior to

discharge.  Continue to decrease corticosteroids slowly.  We will follow

closely with you.  I will discuss the patient's status with Dr. Shepherd who

has been absent over the last 4 days, so he is up to par when he returns in

the morning.  In the interim, the methylprednisolone has been decreased to

40 mg every 12 hours in the morning, can be decreased to 30 mg IV push

every 12 hours.  We will discuss with Dr. Shepherd and follow closely with

you.



Thank you for the opportunity to continue to the care for this gelacio

patient.







__________________________________________

Cisco Najera MD



DD:  10/14/2018 11:22:15

DT:  10/14/2018 11:25:19

Job # [id number]

## 2018-10-14 NOTE — PN
DATE:  10/14/2018



SUBJECTIVE:  Patient is 68 years old, seen and examined, lying in bed. 

Seems to be comfortable on high flow oxygen, saturating well.  Complained

of some cough and congestion at times.



PHYSICAL EXAMINATION

VITAL SIGNS:  He is afebrile.  Pulse 84, respirations 20, blood pressure

106/73.

LUNGS:  Bilateral fair air flow.  No rhonchi or crackles.  Decreased breath

sound at the bases.

HEART:  S1, S2 audible.

ABDOMEN:  Soft, nontender, no rebound, no guarding.

NEUROLOGIC:  Patient is awake, alert, oriented, and able to communicate.



LABORATORY EXAM:  WBC 8.8, hemoglobin 14.6, hematocrit 44.9, and platelets

212.  Chemistries:  Sodium 136, potassium 5.3, chloride 104, CO2 of 28. 

BUN 37, creatinine 1.1.  Blood sugar of 120.  HIV is positive.



ASSESSMENT:

1.  Respiratory insufficiency.

2.  Right lower lobe pneumonia.

3.  Human immunodeficiency virus positive.

4.  Metastatic lung cancer, status post partial pneumonectomy.

5.  Right lung mass.

6.  Hypertension.



PLAN:  Patient is on nebulizer treatment.  He is on meropenem.  We will

discontinue his potassium since he is running on the high side, we will

monitor chem-7 in the a.m.  He is on DVT prophylaxis.  Patient's oral

intake is fair.  I will discontinue his IV fluid.  We will follow up this

patient _____.







__________________________________________

Radhika Vegas MD



DD:  10/14/2018 11:57:09

DT:  10/14/2018 12:35:35

Job # [id number]

## 2018-10-15 LAB
ALBUMIN SERPL-MCNC: 3.7 G/DL (ref 3–4.8)
ALBUMIN/GLOB SERPL: 1 {RATIO} (ref 1.1–1.8)
ALT SERPL-CCNC: 60 U/L (ref 7–56)
AST SERPL-CCNC: 36 U/L (ref 17–59)
BASOPHILS # BLD AUTO: 0 K/MM3 (ref 0–2)
BASOPHILS NFR BLD: 0 % (ref 0–3)
BUN SERPL-MCNC: 46 MG/DL (ref 7–21)
CALCIUM SERPL-MCNC: 9.6 MG/DL (ref 8.4–10.5)
EOSINOPHIL # BLD: 0 10*3/UL (ref 0–0.7)
EOSINOPHIL NFR BLD: 0.3 % (ref 1.5–5)
ERYTHROCYTE [DISTWIDTH] IN BLOOD BY AUTOMATED COUNT: 14.2 % (ref 11.5–14.5)
GFR NON-AFRICAN AMERICAN: > 60
GRANULOCYTES # BLD: 8.49 10*3/UL (ref 1.4–6.5)
GRANULOCYTES NFR BLD: 77.5 % (ref 50–68)
HGB BLD-MCNC: 15.4 G/DL (ref 14–18)
LYMPHOCYTES # BLD: 1.7 10*3/UL (ref 1.2–3.4)
LYMPHOCYTES NFR BLD AUTO: 15.2 % (ref 22–35)
MCH RBC QN AUTO: 22.8 PG (ref 25–35)
MCHC RBC AUTO-ENTMCNC: 31.1 G/DL (ref 31–37)
MCV RBC AUTO: 73.4 FL (ref 80–105)
MONOCYTES # BLD AUTO: 0.8 10*3/UL (ref 0.1–0.6)
MONOCYTES NFR BLD: 7 % (ref 1–6)
PLATELET # BLD: 200 10^3/UL (ref 120–450)
PMV BLD AUTO: 10.5 FL (ref 7–11)
RBC # BLD AUTO: 6.74 10^6/UL (ref 3.5–6.1)
WBC # BLD AUTO: 11 10^3/UL (ref 4.5–11)

## 2018-10-15 RX ADMIN — IPRATROPIUM BROMIDE AND ALBUTEROL SULFATE SCH ML: .5; 3 SOLUTION RESPIRATORY (INHALATION) at 07:40

## 2018-10-15 RX ADMIN — INSULIN LISPRO SCH: 100 INJECTION, SOLUTION INTRAVENOUS; SUBCUTANEOUS at 16:30

## 2018-10-15 RX ADMIN — METHYLPREDNISOLONE SODIUM SUCCINATE SCH MG: 40 INJECTION, POWDER, FOR SOLUTION INTRAMUSCULAR; INTRAVENOUS at 10:20

## 2018-10-15 RX ADMIN — OXYCODONE HYDROCHLORIDE AND ACETAMINOPHEN PRN TAB: 5; 325 TABLET ORAL at 16:19

## 2018-10-15 RX ADMIN — INSULIN LISPRO SCH: 100 INJECTION, SOLUTION INTRAVENOUS; SUBCUTANEOUS at 11:30

## 2018-10-15 RX ADMIN — CALCIUM CARBONATE-CHOLECALCIFEROL TAB 250 MG-125 UNIT SCH TAB: 250-125 TAB at 10:20

## 2018-10-15 RX ADMIN — EFAVIRENZ, EMTRICITABINE, AND TENOFOVIR DISOPROXIL FUMARATE SCH TAB: 600; 200; 300 TABLET, FILM COATED ORAL at 21:31

## 2018-10-15 RX ADMIN — INSULIN LISPRO SCH: 100 INJECTION, SOLUTION INTRAVENOUS; SUBCUTANEOUS at 07:30

## 2018-10-15 RX ADMIN — IPRATROPIUM BROMIDE AND ALBUTEROL SULFATE SCH ML: .5; 3 SOLUTION RESPIRATORY (INHALATION) at 19:55

## 2018-10-15 RX ADMIN — INSULIN LISPRO SCH: 100 INJECTION, SOLUTION INTRAVENOUS; SUBCUTANEOUS at 22:30

## 2018-10-15 RX ADMIN — METHYLPREDNISOLONE SODIUM SUCCINATE SCH MG: 40 INJECTION, POWDER, FOR SOLUTION INTRAMUSCULAR; INTRAVENOUS at 21:29

## 2018-10-15 RX ADMIN — Medication SCH MG: at 10:20

## 2018-10-15 RX ADMIN — IPRATROPIUM BROMIDE AND ALBUTEROL SULFATE SCH ML: .5; 3 SOLUTION RESPIRATORY (INHALATION) at 02:16

## 2018-10-15 RX ADMIN — PANTOPRAZOLE SODIUM SCH MG: 40 TABLET, DELAYED RELEASE ORAL at 10:20

## 2018-10-15 RX ADMIN — Medication SCH: at 18:59

## 2018-10-15 NOTE — PN
DATE:  10/15/2018



SUBJECTIVE:  The patient is in bed, no acute distress, nontoxic.  Seen

earlier today in 269.  He appears comfortably doing well.



PHYSICAL EXAMINATION

VITAL SIGNS:  Temperature is 99, blood pressure is 115/70, respiratory rate

of 18.

HEENT:  Examination of HEENT is unremarkable.

NECK:  Supple.

LUNGS:  Have decreased breath sounds.

HEART:  Normal S1, S2.

ABDOMEN:  Soft, nontender.



LABORATORY EXAMINATION:  Reveals a white count of 11,000, hemoglobin of 15,

BUN of 46, creatinine of 1.1.  Immunology is noted.  Serology is reviewed

and cultures are reviewed.  Dr. Shepherd's note is reviewed.  Dr. Lawrence's note is reviewed.



ASSESSMENT AND PLAN:  This is a 68-year-old male who was admitted with

severe sepsis, acute hypoxic respiratory failure, right lower lobe

healthcare-associated pneumonia and the patient with human immunodeficiency

virus and metastatic lung cancer and lung resection, newly discovered right

lung mass, has received seven days of meropenem, now on p.o. Levaquin x5

days to complete therapy in review of orders.  I discussed his human

immunodeficiency virus medications.  He is to follow up with his primary

human immunodeficiency virus caretaker.  We will follow with you.





__________________________________________

Sang Whitlock MD



DD:  10/15/2018 19:01:54

DT:  10/15/2018 19:35:47

Job # [id number]

## 2018-10-15 NOTE — PN
DATE:  10/13/2018



SUBJECTIVE:  This is a 68-year-old patient of Dr. Lawrence, was admitted

on 10/08/2018 with history of being HIV positive, history of lung cancer,

status post lobectomy, was admitted because of being septic and having

infiltrate.  The patient was seen and examined, sitting in bed, complained

of mild shortness of breath, eating and tolerating.



PHYSICAL EXAMINATION

VITAL SIGNS:  He is afebrile, pulse 87, respiration 18, blood pressure

129/88.

LUNGS:  Bilateral fair airflow decreased at bases.

HEART:  S1, S2 audible.

ABDOMEN:  Soft, nontender.  No rebound, no guarding.

NEUROLOGIC:  The patient is awake and alert, able to communicate.



LABORATORY DATA:  WBC is 10.2, hemoglobin 14.6, hematocrit 45.6, platelet

215.  Chemistry:  Sodium 136, potassium 5.3, chloride 102, CO2 of 28, BUN

28, creatinine 1.2, blood sugar of 120.  Blood cultures are negative. 

Sputum culture positive for yeast.  X-ray on 10/10/2018 shows right lower

lobe infiltrate and opacification of left lung region.



ASSESSMENT:

1.  Respiratory insufficiency.

2.  Chronic obstructive pulmonary disease.

3.  Asthmatic bronchitis.

4.  Metastatic lung cancer with adenopathy.

5.  Mild congestive heart failure.

6.  Human immunodeficiency virus positive.



PLAN:  The patient is getting nebulizer treatment.  DVT prophylaxis.  The

patient is receiving meropenem.  He is on Protonix, getting prednisone

every 12 hours.  We will cut down his IV fluid, _____ cut down his p.o.

potassium to daily instead of b.i.d. since his potassium is running high. 

Followup his electrolytes.





__________________________________________

Radhika Vegas MD



DD:  10/13/2018 16:01:21

DT:  10/13/2018 22:02:18

Job # [id number]

## 2018-10-15 NOTE — PN
DATE:  10/15/2018



SUBJECTIVE:  A 68-year-old white male with history of lobectomy for lung CA

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  The patient is

status post pneumonia.  He is doing well.



PHYSICAL EXAMINATION:

GENERAL:  He is awake, alert, and oriented x3.

VITAL SIGNS:  He is afebrile.  Vital signs are stable.



LABORATORY DATA:  White count is down to 11.  He is slightly azotemic at 46

and 1.1.



He is using high-flow oxygen.  He saturates with short amounts of exertion.

He needs a change in his oxygen at home.  He will start chemotherapy as an

outpatient with Dr. Reynoso.



PLAN:  Start physical therapy and occupational therapy, possible DAWNA versus

TCU versus home with home care and possible _____.  Case was discussed with

Dr. Shepherd.







__________________________________________

Rodney Lawrence MD



DD:  10/15/2018 9:40:14

DT:  10/15/2018 10:46:38

Job # [id number]

## 2018-10-15 NOTE — PN
DATE:  10/15/2018



PULMONARY NOTE DICTATION



SUBJECTIVE:  The patient appears comfortable this morning.  He is not short

of breath at rest.



PHYSICAL EXAMINATION:

VITAL SIGNS:  Temperature is 98, pulse 84, respirations 18, blood pressure

122/83.  Oxygen saturation on high-flow delivery is 98%.

HEENT:  Normocephalic, atraumatic.  No JVD.

CARDIOVASCULAR:  Positive S1, S2.  No S3 gallop.

LUNGS:  Significantly decreased/minimal rhonchi - right lung.  No wheezing. 

Decreased breath sounds - left lung.

EXTREMITIES:  No clubbing, cyanosis or edema.  Calves are nontender to

palpation.

GI:  Abdomen is soft, nontender and nondistended.  Bowel sounds are

positive.

SKIN:  No acute rash.

NEUROLOGIC:  Exam limited at the present time.



IMPRESSION:

1.  Acute hypoxemic respiratory failure.

2.  Right lower lobe pneumonia.

3.  Advanced lung cancer.

4.  Advanced chronic obstructive pulmonary disease.

5.  Mild bronchospasm.



PLAN:  The patient appears comfortable this morning.  He is not short of

breath at rest.  He does state to feeling much better overall.  I did

discuss the case with the night nurse at length.  The night nurse stated

the patient had a very good night.



On physical exam, his bronchospasm continues to resolve.  In addition, the

alveolar-arterial gradient also continues to resolve.  I will continue the

current nebulizer treatments and decrease the intravenous steroids this

morning.  The patient remains on antibiotic therapy.  Input by Infectious

Disease is noted.



Clinical status of the patient is significantly improved - compared to the

initial presentation.  However, again, unfortunately, the overall

status/prognosis for this patient remains poor.  I will discuss the above

with the attending physician.





__________________________________________

Bennie Shepherd MD





DD:  10/15/2018 7:13:52

DT:  10/15/2018 7:15:09

Job # [id number]



HEMA

## 2018-10-16 LAB
ALBUMIN SERPL-MCNC: 3.3 G/DL (ref 3–4.8)
ALBUMIN/GLOB SERPL: 0.9 {RATIO} (ref 1.1–1.8)
ALT SERPL-CCNC: 66 U/L (ref 7–56)
AST SERPL-CCNC: 36 U/L (ref 17–59)
BASOPHILS # BLD AUTO: 0 K/MM3 (ref 0–2)
BASOPHILS NFR BLD: 0 % (ref 0–3)
BUN SERPL-MCNC: 45 MG/DL (ref 7–21)
CALCIUM SERPL-MCNC: 9 MG/DL (ref 8.4–10.5)
EOSINOPHIL # BLD: 0 10*3/UL (ref 0–0.7)
EOSINOPHIL NFR BLD: 0.3 % (ref 1.5–5)
ERYTHROCYTE [DISTWIDTH] IN BLOOD BY AUTOMATED COUNT: 14.2 % (ref 11.5–14.5)
GFR NON-AFRICAN AMERICAN: > 60
GRANULOCYTES # BLD: 7.48 10*3/UL (ref 1.4–6.5)
GRANULOCYTES NFR BLD: 77.1 % (ref 50–68)
HGB BLD-MCNC: 14.5 G/DL (ref 14–18)
LYMPHOCYTES # BLD: 1.5 10*3/UL (ref 1.2–3.4)
LYMPHOCYTES NFR BLD AUTO: 15.1 % (ref 22–35)
MCH RBC QN AUTO: 22.9 PG (ref 25–35)
MCHC RBC AUTO-ENTMCNC: 30.8 G/DL (ref 31–37)
MCV RBC AUTO: 74.4 FL (ref 80–105)
MONOCYTES # BLD AUTO: 0.7 10*3/UL (ref 0.1–0.6)
MONOCYTES NFR BLD: 7.5 % (ref 1–6)
PLATELET # BLD: 204 10^3/UL (ref 120–450)
PMV BLD AUTO: 10.6 FL (ref 7–11)
RBC # BLD AUTO: 6.33 10^6/UL (ref 3.5–6.1)
WBC # BLD AUTO: 9.7 10^3/UL (ref 4.5–11)

## 2018-10-16 RX ADMIN — IPRATROPIUM BROMIDE AND ALBUTEROL SULFATE SCH ML: .5; 3 SOLUTION RESPIRATORY (INHALATION) at 13:45

## 2018-10-16 RX ADMIN — IPRATROPIUM BROMIDE AND ALBUTEROL SULFATE SCH ML: .5; 3 SOLUTION RESPIRATORY (INHALATION) at 08:05

## 2018-10-16 RX ADMIN — OXYCODONE HYDROCHLORIDE AND ACETAMINOPHEN PRN TAB: 5; 325 TABLET ORAL at 14:11

## 2018-10-16 RX ADMIN — EFAVIRENZ, EMTRICITABINE, AND TENOFOVIR DISOPROXIL FUMARATE SCH TAB: 600; 200; 300 TABLET, FILM COATED ORAL at 22:03

## 2018-10-16 RX ADMIN — PANTOPRAZOLE SODIUM SCH MG: 40 TABLET, DELAYED RELEASE ORAL at 07:55

## 2018-10-16 RX ADMIN — CALCIUM CARBONATE-CHOLECALCIFEROL TAB 250 MG-125 UNIT SCH TAB: 250-125 TAB at 09:13

## 2018-10-16 RX ADMIN — IPRATROPIUM BROMIDE AND ALBUTEROL SULFATE SCH ML: .5; 3 SOLUTION RESPIRATORY (INHALATION) at 19:52

## 2018-10-16 RX ADMIN — INSULIN LISPRO SCH: 100 INJECTION, SOLUTION INTRAVENOUS; SUBCUTANEOUS at 11:30

## 2018-10-16 RX ADMIN — Medication SCH MG: at 09:13

## 2018-10-16 RX ADMIN — INSULIN LISPRO SCH: 100 INJECTION, SOLUTION INTRAVENOUS; SUBCUTANEOUS at 07:54

## 2018-10-16 RX ADMIN — Medication SCH MG: at 17:48

## 2018-10-16 RX ADMIN — METHYLPREDNISOLONE SODIUM SUCCINATE SCH MG: 40 INJECTION, POWDER, FOR SOLUTION INTRAMUSCULAR; INTRAVENOUS at 21:47

## 2018-10-16 RX ADMIN — IPRATROPIUM BROMIDE AND ALBUTEROL SULFATE SCH ML: .5; 3 SOLUTION RESPIRATORY (INHALATION) at 01:20

## 2018-10-16 RX ADMIN — METHYLPREDNISOLONE SODIUM SUCCINATE SCH MG: 40 INJECTION, POWDER, FOR SOLUTION INTRAMUSCULAR; INTRAVENOUS at 09:13

## 2018-10-16 RX ADMIN — INSULIN LISPRO SCH: 100 INJECTION, SOLUTION INTRAVENOUS; SUBCUTANEOUS at 22:28

## 2018-10-16 RX ADMIN — INSULIN LISPRO SCH: 100 INJECTION, SOLUTION INTRAVENOUS; SUBCUTANEOUS at 17:43

## 2018-10-16 NOTE — PN
DATE:  10/16/2018



PULMONARY NOTE DICTATION



SUBJECTIVE:  The patient appears comfortable this morning.  He is not short

of breath at rest.



PHYSICAL EXAMINATION:

VITAL SIGNS:  Temperature is 97.6, pulse 86, respirations 18, blood

pressure 130/86.  Oxygen saturation on nasal cannula is 96%.

HEENT:  Normocephalic, atraumatic.  No JVD.

CARDIOVASCULAR:  Positive S1, S2.  No S3 gallop.

LUNGS:  Much less/minimal rhonchi - right lung.  No wheezing.  Decreased

breath sounds - left lung.

EXTREMITIES:  No clubbing, cyanosis or edema.  Calves are nontender to

palpation.

GI:  Abdomen is soft, nontender and nondistended.  Bowel sounds are

positive.

SKIN:  No acute rash.

NEUROLOGIC:  Exam limited at the present time.



IMPRESSION:

1.  Acute hypoxemic respiratory failure.

2.  Right lower lobe pneumonia.

3.  Advanced lung cancer.

4.  Advanced chronic obstructive pulmonary disease.

5.  Mild bronchospasm.



PLAN:  The patient appears comfortable this morning.  He is not short of

breath at rest.  He does state to feeling much better overall.  I did

discuss the case with the night nurse at length.  The night nurse stated

that the patient had a very good night.



On physical exam, there is certainly less bronchospasm noted.  In addition,

the oxygen saturation on nasal cannula is now 96%.  I will continue the

current nebulizer treatments and low-dose intravenous steroids (decreased

yesterday) for now.  The patient also remains on antibiotic therapy - as

per Infectious Disease.  Input by Dr. Whitlock is noted.



Clinical status of the patient is significantly improved - compared to his

initial presentation.  However, despite  his clinical improvement, the

patient is still very weak and dyspneic with ambulation.  His overall

status remains very poor.  I do feel that a stay in the Transitional Unit - for

physical therapy and increased strengthening - would certainly benefit this

patient prior to going home.  Again, unfortunately, his long-term prognosis

remains very poor.  I will discuss the above with the attending physician.







__________________________________________

Bennie Shepherd MD





DD:  10/16/2018 6:58:13

DT:  10/16/2018 7:00:28

Job # [id number]



MTDALY

## 2018-10-16 NOTE — PN
DATE:  10/16/2018



SUBJECTIVE:  A 68-year-old white male admitted to the hospital with

pneumonia.  Patient has a remote history of lung cancer with a left

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

with mediastinal extension and periaortic extension.  Patient was treated

for pneumonia in the hospital.  He is doing well.  Vital signs are stable. 

He is afebrile.  He is using 5 L of oxygen per minute.  He does have an

elevated BUN and creatinine at 45 and 1.1, however, stable.  His lungs are

clear.  He is less coughing.  He is less short of breath.  He is doing some

physical therapy and occupational therapy.  He is planning on being

discharged most likely in the next 24 hours _____ for his treatment of his

lung cancer.







__________________________________________

Rodney Lawrence MD



DD:  10/16/2018 8:25:28

DT:  10/16/2018 9:36:21

Job # [id number]

## 2018-10-17 ENCOUNTER — HOSPITAL ENCOUNTER (INPATIENT)
Dept: HOSPITAL 42 - TRCU | Age: 68
LOS: 7 days | Discharge: HOME | DRG: 871 | End: 2018-10-24
Attending: INTERNAL MEDICINE | Admitting: INTERNAL MEDICINE
Payer: MEDICARE

## 2018-10-17 VITALS
RESPIRATION RATE: 19 BRPM | SYSTOLIC BLOOD PRESSURE: 120 MMHG | DIASTOLIC BLOOD PRESSURE: 73 MMHG | TEMPERATURE: 98.3 F | HEART RATE: 99 BPM

## 2018-10-17 VITALS — BODY MASS INDEX: 22.5 KG/M2

## 2018-10-17 VITALS — OXYGEN SATURATION: 98 %

## 2018-10-17 DIAGNOSIS — J96.21: ICD-10-CM
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
## 2018-10-17 DIAGNOSIS — Z21: ICD-10-CM

## 2018-10-17 DIAGNOSIS — J18.1: ICD-10-CM

## 2018-10-17 DIAGNOSIS — R65.20: ICD-10-CM

## 2018-10-17 DIAGNOSIS — Z85.118: ICD-10-CM

## 2018-10-17 DIAGNOSIS — I10: ICD-10-CM

## 2018-10-17 DIAGNOSIS — C34.31: ICD-10-CM

## 2018-10-17 DIAGNOSIS — J44.1: ICD-10-CM

## 2018-10-17 DIAGNOSIS — Z90.2: ICD-10-CM

## 2018-10-17 DIAGNOSIS — E11.9: ICD-10-CM

## 2018-10-17 DIAGNOSIS — J44.0: ICD-10-CM

## 2018-10-17 DIAGNOSIS — A41.9: Primary | ICD-10-CM

## 2018-10-17 DIAGNOSIS — Z66: ICD-10-CM

## 2018-10-17 DIAGNOSIS — R59.0: ICD-10-CM

## 2018-10-17 LAB
ALBUMIN SERPL-MCNC: 3.4 G/DL (ref 3–4.8)
ALBUMIN/GLOB SERPL: 1 {RATIO} (ref 1.1–1.8)
ALT SERPL-CCNC: 57 U/L (ref 7–56)
AST SERPL-CCNC: 32 U/L (ref 17–59)
BASOPHILS # BLD AUTO: 0.01 K/MM3 (ref 0–2)
BASOPHILS NFR BLD: 0.1 % (ref 0–3)
BUN SERPL-MCNC: 31 MG/DL (ref 7–21)
CALCIUM SERPL-MCNC: 8.7 MG/DL (ref 8.4–10.5)
EOSINOPHIL # BLD: 0.1 10*3/UL (ref 0–0.7)
EOSINOPHIL NFR BLD: 0.7 % (ref 1.5–5)
ERYTHROCYTE [DISTWIDTH] IN BLOOD BY AUTOMATED COUNT: 14.2 % (ref 11.5–14.5)
GFR NON-AFRICAN AMERICAN: > 60
GRANULOCYTES # BLD: 9.73 10*3/UL (ref 1.4–6.5)
GRANULOCYTES NFR BLD: 78.3 % (ref 50–68)
HGB BLD-MCNC: 14.3 G/DL (ref 14–18)
LYMPHOCYTES # BLD: 1.5 10*3/UL (ref 1.2–3.4)
LYMPHOCYTES NFR BLD AUTO: 12.1 % (ref 22–35)
MCH RBC QN AUTO: 23.1 PG (ref 25–35)
MCHC RBC AUTO-ENTMCNC: 31 G/DL (ref 31–37)
MCV RBC AUTO: 74.5 FL (ref 80–105)
MONOCYTES # BLD AUTO: 1.1 10*3/UL (ref 0.1–0.6)
MONOCYTES NFR BLD: 8.8 % (ref 1–6)
PLATELET # BLD: 204 10^3/UL (ref 120–450)
PMV BLD AUTO: 10.7 FL (ref 7–11)
RBC # BLD AUTO: 6.2 10^6/UL (ref 3.5–6.1)
WBC # BLD AUTO: 12.4 10^3/UL (ref 4.5–11)

## 2018-10-17 RX ADMIN — OXYCODONE HYDROCHLORIDE AND ACETAMINOPHEN PRN TAB: 5; 325 TABLET ORAL at 15:09

## 2018-10-17 RX ADMIN — IPRATROPIUM BROMIDE AND ALBUTEROL SULFATE SCH ML: .5; 3 SOLUTION RESPIRATORY (INHALATION) at 13:51

## 2018-10-17 RX ADMIN — CALCIUM CARBONATE-CHOLECALCIFEROL TAB 250 MG-125 UNIT SCH TAB: 250-125 TAB at 11:01

## 2018-10-17 RX ADMIN — INSULIN LISPRO SCH: 100 INJECTION, SOLUTION INTRAVENOUS; SUBCUTANEOUS at 08:15

## 2018-10-17 RX ADMIN — INSULIN LISPRO SCH UNIT: 100 INJECTION, SOLUTION INTRAVENOUS; SUBCUTANEOUS at 17:55

## 2018-10-17 RX ADMIN — INSULIN LISPRO SCH UNIT: 100 INJECTION, SOLUTION INTRAVENOUS; SUBCUTANEOUS at 17:52

## 2018-10-17 RX ADMIN — INSULIN LISPRO SCH: 100 INJECTION, SOLUTION INTRAVENOUS; SUBCUTANEOUS at 11:54

## 2018-10-17 RX ADMIN — IPRATROPIUM BROMIDE AND ALBUTEROL SULFATE SCH ML: .5; 3 SOLUTION RESPIRATORY (INHALATION) at 20:59

## 2018-10-17 RX ADMIN — PANTOPRAZOLE SODIUM SCH MG: 40 TABLET, DELAYED RELEASE ORAL at 08:17

## 2018-10-17 RX ADMIN — IPRATROPIUM BROMIDE AND ALBUTEROL SULFATE SCH ML: .5; 3 SOLUTION RESPIRATORY (INHALATION) at 01:42

## 2018-10-17 RX ADMIN — INSULIN LISPRO SCH: 100 INJECTION, SOLUTION INTRAVENOUS; SUBCUTANEOUS at 21:32

## 2018-10-17 RX ADMIN — OXYCODONE HYDROCHLORIDE AND ACETAMINOPHEN PRN TAB: 5; 325 TABLET ORAL at 22:44

## 2018-10-17 RX ADMIN — METHYLPREDNISOLONE SODIUM SUCCINATE SCH MG: 40 INJECTION, POWDER, FOR SOLUTION INTRAMUSCULAR; INTRAVENOUS at 11:02

## 2018-10-17 RX ADMIN — Medication SCH MG: at 11:01

## 2018-10-17 RX ADMIN — IPRATROPIUM BROMIDE AND ALBUTEROL SULFATE SCH ML: .5; 3 SOLUTION RESPIRATORY (INHALATION) at 07:25

## 2018-10-17 RX ADMIN — Medication SCH MG: at 17:52

## 2018-10-17 NOTE — PN
DATE:  10/17/2018



SUBJECTIVE:  A 68-year-old white male with lung CA, status post lobectomy,

recurrent lung CA, mediastinal adenopathy, status post pneumonia, hypoxia,

on high-flow oxygen.  The patient is doing well.  He was able to do some

physical therapy yesterday, able to walk, and ambulate in the carpenter with

oxygen.  Will be transferred to TCU for further rehabilitation.



PHYSICAL EXAMINATION:

CHEST:  Shows it is clear except for there are no breath sounds in the left

lung.

HEART:  S1 and S2, regular sinus with mild sinus tachycardia.

VITAL SIGNS:  Patient is afebrile today.  Vital signs are stable.



LABORATORY DATA:  He does have an elevated BUN and creatinine at 45 and

1.1, which were repeated.  He is status post fluid hydration.



PLAN:  Transfer to TCU ASAP.







__________________________________________

Rodney Lawrence MD





DD:  10/17/2018 9:25:45

DT:  10/17/2018 10:43:25

Job # [id number]

## 2018-10-17 NOTE — PN
DATE:  10/16/2018



SUBJECTIVE:  The patient is in bed, in no acute distress, nontoxic.



PHYSICAL EXAMINATION:

VITAL SIGNS:  Temperature is 98, blood pressure is 120/80, respiratory rate

of 18, heart rate of 104.

HEENT:  Unremarkable.

NECK:  Supple.

LUNGS:  Have decreased breath sounds.

HEART:  Normal S1, S2.

ABDOMEN:  Soft.



LABORATORY EXAMINATION:  Reveals the white count of 9.7, hemoglobin of 14. 

BUN of 45, creatinine of 1.1.  Procalcitonin is noted.  CD4 is reviewed. 

Serology is reviewed.



Review of medication reveals the patient to be on Atripla, which is the

combination of efavirenz, emtricitabine and tenofovir.  Patient is also on

p.o. Levaquin.  Dr. Lawrence's note is reviewed.  Dr. Shepherd's note is

reviewed.



ASSESSMENT AND PLAN:  This is a 68-year-old male who is admitted with

severe sepsis, acute hypoxic respiratory failure, right lower lobe

healthcare-associated pneumonia, human immunodeficiency virus, metastatic

lung cancer, lung resection, newly discovered right lung mass and has

received meropenem, now on p.o. Levaquin to complete 5 days as ordered. 

Case discussed with Dr. Lawrence.  Patient will follow up with primary

human immunodeficiency virus caretaker.  I had a discussion with the

patient regarding Atripla and use of a new agent human immunodeficiency

virus medication.  We will discuss it with his primary human

immunodeficiency virus caretaker.







__________________________________________

Sang Whitlock MD



DD:  10/16/2018 21:21:34

DT:  10/17/2018 0:39:59

Job # [id number]

## 2018-10-17 NOTE — PN
DATE:  10/17/2018



PULMONARY NOTE



SUBJECTIVE:  The patient appears comfortable this morning.  He is not short

of breath at rest.



PHYSICAL EXAMINATION:

VITAL SIGNS:  Temperature is 98.7, pulse 81, respirations 19, blood

pressure 118/68.  Oxygen saturation on nasal cannula is 98%.

HEENT:  Normocephalic, atraumatic.  No JVD.

CARDIOVASCULAR:  Positive S1, S2.  No S3 gallop.

LUNGS:  Minimal rhonchi - right lung.  No wheezing.  Decreased breath

sounds - left lung.

EXTREMITIES:  No clubbing, cyanosis or edema.  Calves are nontender to

palpation.

GI:  Abdomen is soft, nontender and nondistended.  Bowel sounds are

positive.

SKIN:  No acute rash.

NEUROLOGIC:  Limited at the present time.



IMPRESSION:

1.  Acute hypoxemic respiratory failure.

2.  Right lower lobe pneumonia.

3.  Advanced lung cancer.

4.  Advanced chronic obstructive pulmonary disease.

5.  Mild bronchospasm.



PLAN:  The patient appears comfortable this morning.  He is not short of

breath at rest.  He does state to feeling much better overall.  I did

discuss the case with the night nurse at length.  The night nurse stated

that the patient had a good night.  In addition, I did discuss the

patient's ambulation (yesterday) with the nurse at length.  Apparently, he

did ambulate around the carpenter.  However, at the end of his walk, he was

significantly dyspneic.  He was also noted to be very weak.



On physical exam, his bronchospasm continues to resolve.  In addition, the

alveolar arterial gradient also continues to resolve.  I will continue the

current nebulizer treatments and low-dose intravenous steroids for now. 

The patient also remains on antibiotic therapy - as per Infectious Disease.

Input by Dr. Whitlock is noted.



Clinical status of the patient is significantly improved - compared to the

initial presentation.  Unfortunately, his overall status/prognosis remains

poor.  As above, the patient does remain dyspneic on exertion and weak. 

Again, I do feel that a short stay in the Transitional Unit - for physical

therapy and strengthening - would benefit this patient greatly.  He is now

agreeable for the transfer.  I will discuss the above with the attending

physician.







__________________________________________

Bennie Shepherd MD









DD:  10/17/2018 7:14:20

DT:  10/17/2018 7:16:18

[facility] # [id number]



MTDALY

## 2018-10-18 PROCEDURE — F08Z4ZZ HOME MANAGEMENT TREATMENT: ICD-10-PCS | Performed by: INTERNAL MEDICINE

## 2018-10-18 PROCEDURE — F07M6ZZ THERAPEUTIC EXERCISE TREATMENT OF MUSCULOSKELETAL SYSTEM - WHOLE BODY: ICD-10-PCS | Performed by: INTERNAL MEDICINE

## 2018-10-18 PROCEDURE — F08Z0ZZ BATHING/SHOWERING TECHNIQUES TREATMENT: ICD-10-PCS | Performed by: INTERNAL MEDICINE

## 2018-10-18 PROCEDURE — F08Z1ZZ DRESSING TECHNIQUES TREATMENT: ICD-10-PCS | Performed by: INTERNAL MEDICINE

## 2018-10-18 PROCEDURE — F07Z9FZ GAIT TRAINING/FUNCTIONAL AMBULATION TREATMENT USING ASSISTIVE, ADAPTIVE, SUPPORTIVE OR PROTECTIVE EQUIPMENT: ICD-10-PCS | Performed by: INTERNAL MEDICINE

## 2018-10-18 RX ADMIN — IPRATROPIUM BROMIDE AND ALBUTEROL SULFATE SCH: .5; 3 SOLUTION RESPIRATORY (INHALATION) at 02:47

## 2018-10-18 RX ADMIN — Medication SCH MG: at 17:14

## 2018-10-18 RX ADMIN — Medication SCH MG: at 10:01

## 2018-10-18 RX ADMIN — INSULIN LISPRO SCH: 100 INJECTION, SOLUTION INTRAVENOUS; SUBCUTANEOUS at 22:27

## 2018-10-18 RX ADMIN — IPRATROPIUM BROMIDE AND ALBUTEROL SULFATE SCH ML: .5; 3 SOLUTION RESPIRATORY (INHALATION) at 07:14

## 2018-10-18 RX ADMIN — INSULIN LISPRO SCH: 100 INJECTION, SOLUTION INTRAVENOUS; SUBCUTANEOUS at 12:08

## 2018-10-18 RX ADMIN — OXYCODONE HYDROCHLORIDE AND ACETAMINOPHEN PRN TAB: 5; 325 TABLET ORAL at 22:36

## 2018-10-18 RX ADMIN — OXYCODONE HYDROCHLORIDE AND ACETAMINOPHEN PRN TAB: 5; 325 TABLET ORAL at 13:58

## 2018-10-18 RX ADMIN — IPRATROPIUM BROMIDE AND ALBUTEROL SULFATE SCH ML: .5; 3 SOLUTION RESPIRATORY (INHALATION) at 14:46

## 2018-10-18 RX ADMIN — PANTOPRAZOLE SODIUM SCH MG: 40 TABLET, DELAYED RELEASE ORAL at 05:15

## 2018-10-18 RX ADMIN — CALCIUM CARBONATE-CHOLECALCIFEROL TAB 250 MG-125 UNIT SCH TAB: 250-125 TAB at 10:02

## 2018-10-18 RX ADMIN — IPRATROPIUM BROMIDE AND ALBUTEROL SULFATE SCH ML: .5; 3 SOLUTION RESPIRATORY (INHALATION) at 19:24

## 2018-10-18 RX ADMIN — INSULIN LISPRO SCH UNIT: 100 INJECTION, SOLUTION INTRAVENOUS; SUBCUTANEOUS at 17:13

## 2018-10-18 RX ADMIN — INSULIN LISPRO SCH: 100 INJECTION, SOLUTION INTRAVENOUS; SUBCUTANEOUS at 06:36

## 2018-10-18 RX ADMIN — EFAVIRENZ, EMTRICITABINE, AND TENOFOVIR DISOPROXIL FUMARATE SCH TAB: 600; 200; 300 TABLET, FILM COATED ORAL at 21:47

## 2018-10-18 NOTE — PN
DATE:  10/18/2018



PULMONARY NOTE



SUBJECTIVE:  The patient appears comfortable this morning.  He is not short

of breath at rest.



PHYSICAL EXAMINATION:

VITAL SIGNS:  Temperature is 97.9, pulse 83, respirations 16, blood

pressure 126/75.  Oxygen saturation on nasal cannula is 97%.

HEENT:  Normocephalic, atraumatic.  No JVD.

CARDIOVASCULAR:  Positive S1, S2.  No S3 gallop.

LUNGS:  Very minimal/less rhonchi - right lung.  No wheezing.  Decreased

breath sounds - left lung.

EXTREMITIES:  No clubbing, cyanosis, or edema.  Calves are nontender to

palpation.

GASTROINTESTINAL:  Abdomen is soft, nontender, and nondistended.  Bowel

sounds are positive.

SKIN:  No acute rash.

NEUROLOGIC:  Limited at the present time.



IMPRESSION:

1.  Acute hypoxemic respiratory failure.

2.  Right lower lobe pneumonia.

3.  Advanced lung cancer.

4.  Advanced chronic obstructive pulmonary disease.

5.  Mild bronchospasm.



PLAN:  The patient appears comfortable this morning.  He is not short of

breath at rest.  He does state to feeling much better overall.  I did

discuss the case with night nurse at length.  The night nurse stated that

the patient had a good night.



On physical exam, his bronchospasm continues to slowly resolve.  In

addition, the alveolar-arterial gradient also continues to resolve.  I will

continue with the current nebulizer treatments and change to oral steroids

this morning.  The patient remains on antibiotic therapy - as per

Infectious Disease.  Input by Dr. Whitlock is noted.



Clinical status of the patient is certainly improved - compared to his

initial presentation.  However, again, unfortunately, the future

status/prognosis for this patient remains poor.  All are aware.  The

patient is now on the Transitional Unit - where he will participate with

physical therapy.  I will discuss the above with Dr. Lawrence.







__________________________________________

Bennie Shepherd MD



DD:  10/18/2018 7:39:10

DT:  10/18/2018 7:40:19

Job # [id number]

MTDD

## 2018-10-18 NOTE — DS
HISTORY OF PRESENT ILLNESS:  The patient is a 68-year-old white male HIV

positive, history of left lobectomy for lung CA, recent history of

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

mediastinal invasion and para-aortic invasion.  The patient also had

pneumonia and hypoxia.  The patient was discharged from [facility]

after treatment for pneumonia and bronchospasm and hypoxia.  He was

discharged to TCU to continue physical therapy and occupational therapy

before being discharged home.



FINAL DISCHARGE DIAGNOSES:  Recurrent lung cancer, history of lung cancer

with lobectomy and history of being human immunodeficiency virus positive.







__________________________________________

Rodney Lawrence MD





DD:  10/18/2018 9:03:43

DT:  10/18/2018 10:14:29

Job # [id number]

## 2018-10-18 NOTE — HP
HISTORY OF PRESENT ILLNESS:  A 68-year-old white male admitted from [facility] TCU.  The patient has a history of being HIV positive.  He has a

DNR/DNI.  He has a history of lung cancer in the left lung, treated with

lobectomy.  The patient has recurrent metastatic squamous cell cancer in

the right lung with para-aortic and mediastinal adenopathy.  The patient

was recently treated for pneumonia and bronchospasm hypoxia.  He is on

oxygen, doing well.  Will be doing physical therapy and occupational

therapy and I will continue his HIV meds and then he will be followed by

Dr. Cortes for chemotherapy as an outpatient.



PHYSICAL EXAMINATION:

GENERAL:  Shows a well-developed, but thin white male in mild respiratory

distress, using continuous nasal oxygen.

CHEST:  Clear in the right thorax.  There are no breath sounds in the left

thorax.

HEART:  Regular sinus rhythm, but mild sinus tachycardia.

VITAL SIGNS:  Stable.  Temperature is 97.9, pulse is 83, and blood pressure

is 126/75.

ABDOMEN:  Thin, but benign.

EXTREMITIES:  Without cyanosis, clubbing, or edema.

NEUROLOGIC:  Grossly intact.  The patient is awake, alert and oriented x3.







__________________________________________

Rodney Lawrence MD





DD:  10/18/2018 9:05:28

DT:  10/18/2018 11:03:30

Job # [id number]

## 2018-10-19 RX ADMIN — PANTOPRAZOLE SODIUM SCH MG: 40 TABLET, DELAYED RELEASE ORAL at 05:58

## 2018-10-19 RX ADMIN — INSULIN LISPRO SCH: 100 INJECTION, SOLUTION INTRAVENOUS; SUBCUTANEOUS at 07:02

## 2018-10-19 RX ADMIN — IPRATROPIUM BROMIDE AND ALBUTEROL SULFATE SCH ML: .5; 3 SOLUTION RESPIRATORY (INHALATION) at 19:36

## 2018-10-19 RX ADMIN — Medication SCH MG: at 09:58

## 2018-10-19 RX ADMIN — OXYCODONE HYDROCHLORIDE AND ACETAMINOPHEN PRN TAB: 5; 325 TABLET ORAL at 22:31

## 2018-10-19 RX ADMIN — INSULIN LISPRO SCH: 100 INJECTION, SOLUTION INTRAVENOUS; SUBCUTANEOUS at 22:04

## 2018-10-19 RX ADMIN — EFAVIRENZ, EMTRICITABINE, AND TENOFOVIR DISOPROXIL FUMARATE SCH TAB: 600; 200; 300 TABLET, FILM COATED ORAL at 21:40

## 2018-10-19 RX ADMIN — IPRATROPIUM BROMIDE AND ALBUTEROL SULFATE SCH: .5; 3 SOLUTION RESPIRATORY (INHALATION) at 01:23

## 2018-10-19 RX ADMIN — INSULIN LISPRO SCH: 100 INJECTION, SOLUTION INTRAVENOUS; SUBCUTANEOUS at 12:17

## 2018-10-19 RX ADMIN — CALCIUM CARBONATE-CHOLECALCIFEROL TAB 250 MG-125 UNIT SCH TAB: 250-125 TAB at 09:58

## 2018-10-19 RX ADMIN — INSULIN LISPRO SCH UNIT: 100 INJECTION, SOLUTION INTRAVENOUS; SUBCUTANEOUS at 17:19

## 2018-10-19 RX ADMIN — IPRATROPIUM BROMIDE AND ALBUTEROL SULFATE SCH ML: .5; 3 SOLUTION RESPIRATORY (INHALATION) at 13:24

## 2018-10-19 RX ADMIN — IPRATROPIUM BROMIDE AND ALBUTEROL SULFATE SCH ML: .5; 3 SOLUTION RESPIRATORY (INHALATION) at 07:08

## 2018-10-19 RX ADMIN — OXYCODONE HYDROCHLORIDE AND ACETAMINOPHEN PRN TAB: 5; 325 TABLET ORAL at 15:14

## 2018-10-19 RX ADMIN — Medication SCH MG: at 17:20

## 2018-10-19 NOTE — PN
DATE:  10/19/2018



PULMONARY NOTE



SUBJECTIVE:  The patient appears comfortable this morning.  He is not short

of breath at rest.



PHYSICAL EXAMINATION:

VITAL SIGNS:  Last temperature recorded is 97.5, pulse this morning is

approximately 90, respiratory rate 18-20, blood pressure 117/78.  Oxygen

saturation on nasal cannula is 95%-97%.

HEENT:  Normocephalic, atraumatic.  No JVD.

CARDIOVASCULAR:  Positive S1, S2.  No S3 gallop.

LUNGS:  Minimal/less rhonchi - right lung.  No wheezing.  Decreased breath

sounds - left lung.

EXTREMITIES:  No clubbing, cyanosis, or edema.  Calves are nontender to

palpation.

GASTROINTESTINAL:  Abdomen is soft, nontender, and nondistended.  Bowel

sounds are positive.

SKIN:  No acute rash.

NEUROLOGIC:  Limited at the present time.



IMPRESSION:

1.  Acute hypoxemic respiratory failure.

2.  Right lower lobe pneumonia.

3.  Advanced lung cancer.

4.  Advanced chronic obstructive pulmonary disease.

5.  Mild bronchospasm.



PLAN:  The patient appears comfortable this morning.  He is not short of

breath at rest.  He does state to feeling much better overall.  On physical

exam, there is certainly less bronchospasm noted.  In addition, the

alveolar-arterial gradient is also much less.  I will continue with the

current nebulizer treatments and oral steroids (changed yesterday) for now.

The patient remains on antibiotic therapy as per Infectious Disease.  Input

by Dr. Whitlock is noted.



Clinical status of the patient is significantly improved - compared to the

initial presentation.  However, again, unfortunately, the overall

status/prognosis for this patient remains poor.  The patient appears to be

doing well on the Transitional Unit, and is trying his best.  I will

discuss the above with the attending physician.







__________________________________________

Bennie Shepherd MD

DD:  10/19/2018 6:47:02

DT:  10/19/2018 6:48:35

Job # [id number]

MTDD

## 2018-10-19 NOTE — PN
DATE:  10/19/2018



SUBJECTIVE:  Patient is in bed in no acute distress, nontoxic.



PHYSICAL EXAMINATION:

VITAL SIGNS:  On exam, temperature is 98, blood pressure is 120/70,

respiratory rate of 18.

HEENT:  Examination of HEENT is unremarkable.

NECK:  Supple.

LUNGS:  Have decreased breath sounds.

HEART:  Normal S1 and S2.

ABDOMEN:  Soft, nontender.



LABORATORY EXAMINATION:  Reveals the patient has chemistries, glucose is

noted.



MEDICATIONS:  Review of orders reveals the patient to be on efavirenz,

emtricitabine, tenofovir.  Patient is also on p.o. Levaquin.



ASSESSMENT AND PLAN:  A 68-year-old male who has past medical history

significant for human immunodeficiency virus positive and metastatic lung

cancer and hypertension, and severe sepsis and acute hypoxic respiratory

failure, right lower lobe healthcare-associated pneumonia, currently on his

HIV medication and Levaquin.  Dr. Shepherd's note is appreciated.  Dr. Lawrence's note is reviewed.







__________________________________________

Sang Whitlock MD



DD:  10/19/2018 18:27:03

DT:  10/19/2018 20:42:40

Job # [id number]

## 2018-10-19 NOTE — CON
DATE:  10/18/2018



The patient is in seen in room 303.



CHIEF COMPLAINT:  Weakness.



HISTORY OF PRESENT ILLNESS  This is a 68-year-old male with past medical

history significant for positive HIV, metastatic lung cancer and

hypertension who was admitted with pulmonary symptoms and _____ breathing

was given antibiotics in the acute care, now transferred  to the

transitional care, and the patient states that his shortness of breath is

improved.  No nausea, no vomiting or chest pain, no abdominal pain,

diarrhea or constipation.



PAST MEDICAL HISTORY:  Significant for positive HIV, acute hypoxic

respiratory failure and metastatic lung cancer and newly discovered new

lung mass and was treated with meropenem and now transferred and currently

on Levaquin.



PAST SURGERIES:  Significant for pneumonectomy.



ALLERGIES:  THE PATIENT HAS NO KNOWN ALLERGIES.



MEDICATIONS:  For her HIV includes Atripla.  He sees a nurse practitioner

for his HIV care.



PHYSICAL EXAMINATION:  The patient is in bed with a temperature of 97 and

blood pressure is 140/80, respiratory rate of 18.  Examination of HEENT is

unremarkable.  NECK:  Supple.  LUNGS:  Have decreased breath sounds. 

HEART:  Normal S1, S2.  Abdomen:  Examination is soft, nontender.



LABORATORY:  Reveals a white count of 12,400 and BUN of 31, creatinine of

0.9, and procalcitonin is less than 0.05 and immunology is noted.  The

patient's T-cells is 27% at 318, and his HIV PCR is 1.36 log.



ASSESSMENT AND PLAN:  This is a 68 year old with severe sepsis, acute

hypoxic respiratory failure with a right lower lobe healthcare-associated

pneumonia.  The patient had positive HIV and metastatic lung cancer,

currently now on p.o. Levaquin and Dr. Shepherd's note from today is

reviewed.  We will complete the p.o. Levaquin therapy.  He is to continue

his HIV medication of efavirenz and emtricitabine, tenofovir and on

Levaquin p.o.  We will follow closely with you.





__________________________________________

Sang Whitlock MD





__________________________________________

 



DD:  10/18/2018 10:08:32

DT:  10/18/2018 10:12:20

[facility] # [id number]

## 2018-10-19 NOTE — PN
DATE:  10/19/2018



A 68-year-old white male with HIV positive; lung CA, recurrent in

contralateral lung; mediastinal adenopathy; COPD.  The patient is doing

well, on tapering dose of steroids, bronchodilators, finishing up a course

of antibiotics.  Doing physical therapy and occupational therapy.  The

patient is in TCU.  He is more active with supplemental oxygen.  He does

not need high-dose oxygen anymore.  The patient is to continue physical

therapy and occupational therapy and eventually be discharged home to start

chemotherapy.







__________________________________________

Rodney Lawrence MD



DD:  10/19/2018 8:36:23

DT:  10/19/2018 8:52:47

Job # [id number]

## 2018-10-20 LAB
ALBUMIN SERPL-MCNC: 3.1 G/DL (ref 3–4.8)
ALBUMIN/GLOB SERPL: 0.9 {RATIO} (ref 1.1–1.8)
ALT SERPL-CCNC: 52 U/L (ref 7–56)
AST SERPL-CCNC: 32 U/L (ref 17–59)
BASOPHILS # BLD AUTO: 0.01 K/MM3 (ref 0–2)
BASOPHILS NFR BLD: 0.1 % (ref 0–3)
BUN SERPL-MCNC: 38 MG/DL (ref 7–21)
CALCIUM SERPL-MCNC: 9.2 MG/DL (ref 8.4–10.5)
EOSINOPHIL # BLD: 0.1 10*3/UL (ref 0–0.7)
EOSINOPHIL NFR BLD: 0.7 % (ref 1.5–5)
ERYTHROCYTE [DISTWIDTH] IN BLOOD BY AUTOMATED COUNT: 14.1 % (ref 11.5–14.5)
GFR NON-AFRICAN AMERICAN: > 60
GRANULOCYTES # BLD: 7.91 10*3/UL (ref 1.4–6.5)
GRANULOCYTES NFR BLD: 74.2 % (ref 50–68)
HGB BLD-MCNC: 13.5 G/DL (ref 14–18)
LYMPHOCYTES # BLD: 1.8 10*3/UL (ref 1.2–3.4)
LYMPHOCYTES NFR BLD AUTO: 17.1 % (ref 22–35)
MCH RBC QN AUTO: 22.7 PG (ref 25–35)
MCHC RBC AUTO-ENTMCNC: 30.4 G/DL (ref 31–37)
MCV RBC AUTO: 74.7 FL (ref 80–105)
MONOCYTES # BLD AUTO: 0.8 10*3/UL (ref 0.1–0.6)
MONOCYTES NFR BLD: 7.9 % (ref 1–6)
PLATELET # BLD: 200 10^3/UL (ref 120–450)
PMV BLD AUTO: 10.6 FL (ref 7–11)
RBC # BLD AUTO: 5.94 10^6/UL (ref 3.5–6.1)
WBC # BLD AUTO: 10.7 10^3/UL (ref 4.5–11)

## 2018-10-20 RX ADMIN — Medication SCH MG: at 17:33

## 2018-10-20 RX ADMIN — INSULIN LISPRO SCH: 100 INJECTION, SOLUTION INTRAVENOUS; SUBCUTANEOUS at 22:02

## 2018-10-20 RX ADMIN — IPRATROPIUM BROMIDE AND ALBUTEROL SULFATE SCH: .5; 3 SOLUTION RESPIRATORY (INHALATION) at 02:00

## 2018-10-20 RX ADMIN — INSULIN LISPRO SCH: 100 INJECTION, SOLUTION INTRAVENOUS; SUBCUTANEOUS at 06:43

## 2018-10-20 RX ADMIN — IPRATROPIUM BROMIDE AND ALBUTEROL SULFATE SCH ML: .5; 3 SOLUTION RESPIRATORY (INHALATION) at 14:08

## 2018-10-20 RX ADMIN — IPRATROPIUM BROMIDE AND ALBUTEROL SULFATE SCH ML: .5; 3 SOLUTION RESPIRATORY (INHALATION) at 20:13

## 2018-10-20 RX ADMIN — OXYCODONE HYDROCHLORIDE AND ACETAMINOPHEN PRN TAB: 5; 325 TABLET ORAL at 18:56

## 2018-10-20 RX ADMIN — EFAVIRENZ, EMTRICITABINE, AND TENOFOVIR DISOPROXIL FUMARATE SCH TAB: 600; 200; 300 TABLET, FILM COATED ORAL at 21:13

## 2018-10-20 RX ADMIN — CALCIUM CARBONATE-CHOLECALCIFEROL TAB 250 MG-125 UNIT SCH TAB: 250-125 TAB at 09:07

## 2018-10-20 RX ADMIN — INSULIN LISPRO SCH: 100 INJECTION, SOLUTION INTRAVENOUS; SUBCUTANEOUS at 11:16

## 2018-10-20 RX ADMIN — Medication SCH MG: at 09:07

## 2018-10-20 RX ADMIN — PANTOPRAZOLE SODIUM SCH MG: 40 TABLET, DELAYED RELEASE ORAL at 05:40

## 2018-10-20 RX ADMIN — IPRATROPIUM BROMIDE AND ALBUTEROL SULFATE SCH ML: .5; 3 SOLUTION RESPIRATORY (INHALATION) at 07:12

## 2018-10-20 RX ADMIN — INSULIN LISPRO SCH UNIT: 100 INJECTION, SOLUTION INTRAVENOUS; SUBCUTANEOUS at 17:32

## 2018-10-20 NOTE — PN
DATE:  10/20/2018



SUBJECTIVE:  The patient is in bed, in no acute distress, nontoxic.



PHYSICAL EXAMINATION:

VITAL SIGNS:  Temperature is 97, blood pressure is 140/90, respiratory rate

of 18.

HEENT:  Unremarkable.

NECK:  Supple.

LUNGS:  Decreased breath sounds.

HEART:  Normal S1, S2.

ABDOMEN:  Soft.



LABORATORY EXAMINATION:  Reveals a white count of 10,000, hemoglobin of 13,

and platelets of 200.  BUN of 38, creatinine of 0.9.



ASSESSMENT AND PLAN:  A 68-year-old male with past medical history of

positive human immunodeficiency virus, metastatic lung cancer with

hypertension and severe sepsis, acute hypoxic respiratory failure, right

lower lobe healthcare-associated pneumonia.  On human immunodeficiency

virus medication _____ also on Levaquin.  Review of the orders reveals the

patient to be on Levaquin.  We will continue a total of 5 days.







__________________________________________

Sang Whitlock MD



DD:  10/20/2018 19:15:30

DT:  10/20/2018 19:16:44

Job # [id number]

## 2018-10-20 NOTE — PN
DATE:  10/20/2018



PULMONARY PROGRESS NOTE



SUBJECTIVE:  The patient was seen and examined in Transitional Care Unit. 

He states he is still short of breath, although overall feels a little

better.  He is getting inhalation therapy with DuoNeb and he is on oral

Levaquin as well as oral prednisone at 40 mg a day.



PHYSICAL EXAMINATION:

HEENT:  Examination of head, ears, nose and throat is within normal limits.

VITAL SIGNS:  His temperature is 98, pulse 80, respirations 20, pulse

oximetry is 100 on nasal cannula.

NECK:  Supple with no jugular vein distentions.

CARDIOVASCULAR:  S1, S2.  No S3.  Regular.

PULMONARY:  Diminished breath sounds at both bases with scattered wheezing.

GASTROINTESTINAL:  Soft, nontender.  No organomegaly.

EXTREMITIES:  No pedal edema.

NEUROLOGIC:  No focal deficits.

SKIN:  No acute skin rashes.



LABORATORY DATA:  I reviewed today's lab work.  His WBCs 10.7, hemoglobin

of 13.5.  Chemistries are normal.



ASSESSMENT:

1.  Exacerbation of severe chronic obstructive pulmonary disease.

2.  Chronic respiratory failure.

3.  Resolving right lower lobe pneumonia.

4.  Advanced lung cancer.



PLAN:  The patient appears more comfortable this morning.  He is not short

of breath at rest.  His oxygen saturation is 100 on nasal cannula.  I would

maintain him on current doses, which are moderate doses of oral prednisone.

Continue nebulizer treatment and attempt to mobilize with therapy.





__________________________________________

Reji Villagran MD





DD:  10/20/2018 12:52:24

DT:  10/20/2018 14:00:20

Job # [id number]

## 2018-10-21 VITALS — RESPIRATION RATE: 20 BRPM

## 2018-10-21 RX ADMIN — Medication SCH MG: at 09:08

## 2018-10-21 RX ADMIN — OXYCODONE HYDROCHLORIDE AND ACETAMINOPHEN PRN TAB: 5; 325 TABLET ORAL at 12:57

## 2018-10-21 RX ADMIN — Medication SCH MG: at 17:24

## 2018-10-21 RX ADMIN — IPRATROPIUM BROMIDE AND ALBUTEROL SULFATE SCH ML: .5; 3 SOLUTION RESPIRATORY (INHALATION) at 20:09

## 2018-10-21 RX ADMIN — EFAVIRENZ, EMTRICITABINE, AND TENOFOVIR DISOPROXIL FUMARATE SCH TAB: 600; 200; 300 TABLET, FILM COATED ORAL at 21:44

## 2018-10-21 RX ADMIN — INSULIN LISPRO SCH: 100 INJECTION, SOLUTION INTRAVENOUS; SUBCUTANEOUS at 06:31

## 2018-10-21 RX ADMIN — INSULIN LISPRO SCH: 100 INJECTION, SOLUTION INTRAVENOUS; SUBCUTANEOUS at 11:52

## 2018-10-21 RX ADMIN — INSULIN LISPRO SCH: 100 INJECTION, SOLUTION INTRAVENOUS; SUBCUTANEOUS at 22:12

## 2018-10-21 RX ADMIN — PANTOPRAZOLE SODIUM SCH MG: 40 TABLET, DELAYED RELEASE ORAL at 05:48

## 2018-10-21 RX ADMIN — OXYCODONE HYDROCHLORIDE AND ACETAMINOPHEN PRN TAB: 5; 325 TABLET ORAL at 00:13

## 2018-10-21 RX ADMIN — INSULIN LISPRO SCH UNIT: 100 INJECTION, SOLUTION INTRAVENOUS; SUBCUTANEOUS at 17:24

## 2018-10-21 RX ADMIN — IPRATROPIUM BROMIDE AND ALBUTEROL SULFATE SCH ML: .5; 3 SOLUTION RESPIRATORY (INHALATION) at 14:23

## 2018-10-21 RX ADMIN — IPRATROPIUM BROMIDE AND ALBUTEROL SULFATE SCH: .5; 3 SOLUTION RESPIRATORY (INHALATION) at 01:40

## 2018-10-21 RX ADMIN — OXYCODONE HYDROCHLORIDE AND ACETAMINOPHEN PRN TAB: 5; 325 TABLET ORAL at 22:47

## 2018-10-21 RX ADMIN — CALCIUM CARBONATE-CHOLECALCIFEROL TAB 250 MG-125 UNIT SCH TAB: 250-125 TAB at 09:08

## 2018-10-21 RX ADMIN — IPRATROPIUM BROMIDE AND ALBUTEROL SULFATE SCH ML: .5; 3 SOLUTION RESPIRATORY (INHALATION) at 07:12

## 2018-10-21 NOTE — PN
DATE:  10/21/2018



SUBJECTIVE:  The patient is seen earlier today, in no acute distress.  He

is doing well.  He is much improved.



PHYSICAL EXAMINATION:

VITAL SIGNS:  Temperature is 98, blood pressure is 120/70, respiratory rate

of 16.

HEENT:  Unremarkable.

NECK:  Supple.

LUNGS:  Have decreased breath sounds.

HEART:  Normal S1, S2.

ABDOMINAL:  Soft, nontender.



LABORATORY EXAMINATION:  Reveals the patient's white count to be 10,000,

hemoglobin of 13, platelets of 200.  BUN of 38, creatinine of 0.9.



MEDICATIONS:  Review of medications reveals the patient is on p.o.

Levaquin.



ASSESSMENT AND PLAN:  A 68-year-old male with past medical history of human

immunodeficiency virus, metastatic lung cancer, hypertension, severe

sepsis, acute hypoxic respiratory failure, right lower lobe

healthcare-associated pneumonia currently on Levaquin.  The patient has had

adequate Levaquin therapy.  The patient received 7 days of Zithromax and

meropenem.  We will discontinue Levaquin.  No further antibiotics is

necessary.  He has had over 6 days of Levaquin.





__________________________________________

Sang Whitlock MD



DD:  10/21/2018 9:26:40

DT:  10/21/2018 9:28:20

Job # [id number]

## 2018-10-22 RX ADMIN — EFAVIRENZ, EMTRICITABINE, AND TENOFOVIR DISOPROXIL FUMARATE SCH TAB: 600; 200; 300 TABLET, FILM COATED ORAL at 22:00

## 2018-10-22 RX ADMIN — PANTOPRAZOLE SODIUM SCH MG: 40 TABLET, DELAYED RELEASE ORAL at 06:07

## 2018-10-22 RX ADMIN — OXYCODONE HYDROCHLORIDE AND ACETAMINOPHEN PRN TAB: 5; 325 TABLET ORAL at 12:36

## 2018-10-22 RX ADMIN — CALCIUM CARBONATE-CHOLECALCIFEROL TAB 250 MG-125 UNIT SCH TAB: 250-125 TAB at 10:51

## 2018-10-22 RX ADMIN — IPRATROPIUM BROMIDE AND ALBUTEROL SULFATE SCH ML: .5; 3 SOLUTION RESPIRATORY (INHALATION) at 21:19

## 2018-10-22 RX ADMIN — IPRATROPIUM BROMIDE AND ALBUTEROL SULFATE SCH ML: .5; 3 SOLUTION RESPIRATORY (INHALATION) at 13:05

## 2018-10-22 RX ADMIN — IPRATROPIUM BROMIDE AND ALBUTEROL SULFATE SCH: .5; 3 SOLUTION RESPIRATORY (INHALATION) at 01:31

## 2018-10-22 RX ADMIN — INSULIN LISPRO SCH: 100 INJECTION, SOLUTION INTRAVENOUS; SUBCUTANEOUS at 11:30

## 2018-10-22 RX ADMIN — OXYCODONE HYDROCHLORIDE AND ACETAMINOPHEN PRN TAB: 5; 325 TABLET ORAL at 22:00

## 2018-10-22 RX ADMIN — Medication SCH MG: at 10:51

## 2018-10-22 RX ADMIN — INSULIN LISPRO SCH UNIT: 100 INJECTION, SOLUTION INTRAVENOUS; SUBCUTANEOUS at 16:59

## 2018-10-22 RX ADMIN — INSULIN LISPRO SCH: 100 INJECTION, SOLUTION INTRAVENOUS; SUBCUTANEOUS at 22:13

## 2018-10-22 RX ADMIN — INSULIN LISPRO SCH: 100 INJECTION, SOLUTION INTRAVENOUS; SUBCUTANEOUS at 06:34

## 2018-10-22 RX ADMIN — Medication SCH MG: at 17:11

## 2018-10-22 RX ADMIN — IPRATROPIUM BROMIDE AND ALBUTEROL SULFATE SCH ML: .5; 3 SOLUTION RESPIRATORY (INHALATION) at 07:06

## 2018-10-22 NOTE — PN
DATE:  10/22/2018



SUBJECTIVE:  A 68-year-old white male with history of lung CA.  He is

status post left pneumonectomy for recurrent lung CA in the right lung,

status post pneumonia.  The patient is doing well, doing physical therapy,

he is in TCU.



PHYSICAL EXAMINATION:

VITAL SIGNS:  Stable.  His temperature is 98.1.



He is finishing course of antibiotics.  He is also on steroids,

bronchodilators.  His BUN and creatinine are stable at 38 and 0.9, and

potassium is 4.1.  The patient is tolerating his therapy.  Once done,

continue his oxygen and using supplemental oxygen when he walks.  He will

be discharged home in next several days to follow up with Dr. Reynoso for

his treatment of his lung cancer.





__________________________________________

Rodney Lawrence MD



DD:  10/22/2018 9:48:35

DT:  10/22/2018 10:23:52

Job # [id number]

## 2018-10-22 NOTE — PN
DATE:  10/21/2018



SUBJECTIVE:  A 68-year-old white male with history of lobectomy for lung

CA, recurrent lung CA in the contralateral lung, mediastinal adenopathy,

pneumonia, hypoxia.  The patient is doing better, doing physical therapy

with and without oxygen, does desaturated and gets tachypneic and

tachycardic with long periods of walking without oxygen, but he is also

complaining of some mild heartburn, appetite is stable.



PHYSICAL EXAMINATION:

GENERAL:  The patient is awake, alert and oriented x3.

VITAL SIGNS:  He is afebrile, vital signs are stable.

CHEST:  Shows decreased breath sounds left, normal breath sounds right.  No

adenopathy noted on lung examination.

HEART:  Regular sinus rhythm.



LABORATORY DATA:  Unremarkable except for an elevated BUN and creatinine of

38 and 0.9.



PLAN:  Otherwise, the patient is doing physical therapy and occupational

therapy and we will eventually start chemotherapy as an outpatient.





__________________________________________

Rodney Lawrence MD



DD:  10/21/2018 12:57:56

DT:  10/21/2018 13:10:44

Job # [id number]

## 2018-10-22 NOTE — PN
DATE:  10/22/2018



SUBJECTIVE:  The patient is in bed, in no acute distress, nontoxic.



PHYSICAL EXAMINATION:

VITAL SIGNS:  Temperature is 98, blood pressure is 109/70, respiratory rate

of 18.

HEENT:  Unremarkable.

NECK:  Supple.

LUNGS:  Have decreased breath sounds.

HEART:  Normal S1, S2.

ABDOMEN:  Soft, nontender.



LABORATORY EXAMINATION:  Reveals a white count of 10,700, hemoglobin of 13,

platelets of 200.  Chemistries reveal a BUN of 38, creatinine of 0.9. 

Microbiology is noted.



ASSESSMENT AND PLAN:  A 68-year-old male with positive human

immunodeficiency virus, metastatic lung cancer, hypertension, severe

sepsis, acute hypoxic respiratory failure, right lower lobe

Healthcare-associated pneumonia, currently on Levaquin and completed

Levaquin therapy.  Currently, the patient is off of antibiotics.  I

discussed with him about the use of _____ and possible options.  He states

that he will ask his human immunodeficiency virus provider to call me.





__________________________________________

Sang Whitlock MD





DD:  10/22/2018 18:01:05

DT:  10/22/2018 18:01:49

Job # [id number]

## 2018-10-23 RX ADMIN — Medication SCH MG: at 10:24

## 2018-10-23 RX ADMIN — PANTOPRAZOLE SODIUM SCH MG: 40 TABLET, DELAYED RELEASE ORAL at 05:51

## 2018-10-23 RX ADMIN — INSULIN LISPRO SCH: 100 INJECTION, SOLUTION INTRAVENOUS; SUBCUTANEOUS at 21:51

## 2018-10-23 RX ADMIN — INSULIN LISPRO SCH UNIT: 100 INJECTION, SOLUTION INTRAVENOUS; SUBCUTANEOUS at 17:21

## 2018-10-23 RX ADMIN — Medication SCH MG: at 17:21

## 2018-10-23 RX ADMIN — IPRATROPIUM BROMIDE AND ALBUTEROL SULFATE SCH ML: .5; 3 SOLUTION RESPIRATORY (INHALATION) at 13:01

## 2018-10-23 RX ADMIN — CALCIUM CARBONATE-CHOLECALCIFEROL TAB 250 MG-125 UNIT SCH TAB: 250-125 TAB at 10:24

## 2018-10-23 RX ADMIN — IPRATROPIUM BROMIDE AND ALBUTEROL SULFATE SCH ML: .5; 3 SOLUTION RESPIRATORY (INHALATION) at 20:47

## 2018-10-23 RX ADMIN — INSULIN LISPRO SCH: 100 INJECTION, SOLUTION INTRAVENOUS; SUBCUTANEOUS at 06:37

## 2018-10-23 RX ADMIN — INSULIN LISPRO SCH: 100 INJECTION, SOLUTION INTRAVENOUS; SUBCUTANEOUS at 11:42

## 2018-10-23 RX ADMIN — IPRATROPIUM BROMIDE AND ALBUTEROL SULFATE SCH ML: .5; 3 SOLUTION RESPIRATORY (INHALATION) at 06:59

## 2018-10-23 RX ADMIN — OXYCODONE HYDROCHLORIDE AND ACETAMINOPHEN PRN TAB: 5; 325 TABLET ORAL at 10:38

## 2018-10-23 RX ADMIN — IPRATROPIUM BROMIDE AND ALBUTEROL SULFATE SCH: .5; 3 SOLUTION RESPIRATORY (INHALATION) at 02:17

## 2018-10-23 NOTE — PN
DATE:  10/23/2018



SUBJECTIVE:  The patient is in bed, in no acute distress, nontoxic.



PHYSICAL EXAMINATION:

VITAL SIGNS:  On exam, temperature is 98, blood pressure is 109/70,

respiratory rate of 20.

HEENT:  Examination of HEENT is unremarkable.

NECK:  Supple.

LUNGS:  Have decreased breath sounds.

HEART:  Normal S1, S2.

ABDOMEN:  Soft, nontender.



LABORATORY DATA:  Laboratory examination reveals a white count of 10,000,

hemoglobin of 13, platelets of 200.  Chemistries are noted.  Microbiology

is noted.



ASSESSMENT AND PLAN:  A 68-year-old with positive human immunodeficiency

virus, metastatic lung cancer, hypertension, severe sepsis, acute hypoxic

respiratory failure, right lower lobe healthcare-associated pneumonia. 

Completed the Levaquin therapy and currently off of antibiotics.  The

patient is at risk for developing nosocomial infections.





__________________________________________

Sang Whitlock MD





DD:  10/23/2018 9:46:03

DT:  10/23/2018 9:47:30

Job # [id number]

## 2018-10-23 NOTE — PN
DATE:  10/23/2018



PULMONARY NOTE



SUBJECTIVE:  The patient appears comfortable this morning.  He is not short

of breath at rest.



PHYSICAL EXAMINATION:

VITAL SIGNS:  (Last noted in the computer):  Temperature is 97.8, pulse 99,

respirations 18, blood pressure 109/76.  Oxygen saturation on nasal cannula

is 96%.

HEENT:  Normocephalic, atraumatic.  No JVD.

CARDIOVASCULAR:  Positive S1, S2.  No S3 gallop.

LUNGS:  Very minimal/less rhonchi- right lung.  No wheezing.  Decreased

breath sounds, left lung.

EXTREMITIES:  No clubbing, cyanosis, or edema.  Calves are nontender to

palpation.

GASTROINTESTINAL:  Abdomen is soft, nontender, and nondistended.  Bowel

sounds are positive.

SKIN:  No acute rash.

NEUROLOGIC:  Limited at the present time.



IMPRESSION:

1.  Acute hypoxemic respiratory failure.

2.  Right lower lobe pneumonia.

3.  Advanced lung cancer.

4.  Advanced chronic obstructive pulmonary disease.

5.  Mild bronchospasm.



PLAN:  The patient appears comfortable this morning.  He is not short of

breath at rest.  He does state to feeling much, much better overall.  On

physical exam, his bronchospasm continues to resolve.  In addition, the

alveolar-arterial gradient also continues to resolve.  I will continue with

the current nebulizer treatments and oral steroids (decreased yesterday)

for now.  The patient remains off antibiotic therapy.  There are no

temperatures noted.  There is no leukocytosis.



Clinical status of the patient is significantly improved - compared to the

initial presentation.  However, unfortunately, the future status/prognosis

for this patient remains poor.  I will discuss the above with the attending

physician.







__________________________________________

Bennie Shepherd MD



DD:  10/23/2018 7:04:49

DT:  10/23/2018 7:05:54

Job # [id number]

MTDD

## 2018-10-24 VITALS
DIASTOLIC BLOOD PRESSURE: 78 MMHG | OXYGEN SATURATION: 99 % | SYSTOLIC BLOOD PRESSURE: 121 MMHG | TEMPERATURE: 98.1 F | HEART RATE: 88 BPM

## 2018-10-24 RX ADMIN — PANTOPRAZOLE SODIUM SCH MG: 40 TABLET, DELAYED RELEASE ORAL at 06:04

## 2018-10-24 RX ADMIN — INSULIN LISPRO SCH: 100 INJECTION, SOLUTION INTRAVENOUS; SUBCUTANEOUS at 06:30

## 2018-10-24 RX ADMIN — CALCIUM CARBONATE-CHOLECALCIFEROL TAB 250 MG-125 UNIT SCH TAB: 250-125 TAB at 09:39

## 2018-10-24 RX ADMIN — Medication SCH MG: at 09:39

## 2018-10-24 RX ADMIN — IPRATROPIUM BROMIDE AND ALBUTEROL SULFATE SCH: .5; 3 SOLUTION RESPIRATORY (INHALATION) at 01:56

## 2018-10-24 RX ADMIN — IPRATROPIUM BROMIDE AND ALBUTEROL SULFATE SCH: .5; 3 SOLUTION RESPIRATORY (INHALATION) at 13:06

## 2018-10-24 RX ADMIN — IPRATROPIUM BROMIDE AND ALBUTEROL SULFATE SCH ML: .5; 3 SOLUTION RESPIRATORY (INHALATION) at 07:17

## 2018-10-24 NOTE — PN
DATE:  10/24/2018



SUBJECTIVE:  The patient is in bed, in no acute distress, nontoxic.



PHYSICAL EXAMINATION:

VITAL SIGNS:  On exam, temperature is 98, blood pressure is 120/70,

respiratory rate of 20.

HEENT:  Examination of HEENT is unremarkable.

NECK:  Supple.

LUNGS:  Have decreased breath sounds.

HEART:  Normal S1 and S2.

ABDOMEN:  Soft, nontender.



LABORATORY DATA:  Laboratory examination is noted.



ASSESSMENT AND PLAN:  A 68-year-old with positive human immunodeficiency

virus, metastatic lung cancer, hypertension, severe sepsis, acute hypoxic

respiratory failure, right lower lobe healthcare-associated pneumonia, off

of antibiotics at this time.  We will follow with you.





__________________________________________

Sang Whitlock MD





DD:  10/24/2018 8:55:17

DT:  10/24/2018 8:56:38

Job # [id number]

## 2018-10-24 NOTE — PN
DATE:  10/24/2018



PULMONARY NOTE



SUBJECTIVE:  The patient appears very comfortable this morning.  He is not

short of breath at rest.



PHYSICAL EXAMINATION:

VITAL SIGNS:  Temperature is 98.1, pulse 88, respirations 18-20, blood

pressure 121/78.  Oxygen saturation on nasal cannula is 99%.

HEENT:  Normocephalic, atraumatic.  No JVD.

CARDIOVASCULAR:  Positive S1, S2.  No S3 gallop.

LUNGS:  Right lung is now clear.  No wheezing.  No rhonchi.  Decreased breath

sounds - left lung.

EXTREMITIES:  No clubbing, cyanosis, or edema.  Calves are nontender to

palpation.

GASTROINTESTINAL:  Abdomen is soft, nontender, and nondistended.  Bowel

sounds are positive.

SKIN:  No acute rash.

NEUROLOGIC:  Limited at the present time.



IMPRESSION:

1.  Acute hypoxemic respiratory failure.

2.  Right lower lobe pneumonia.

3.  Advanced lung cancer.

4.  Advanced chronic obstructive pulmonary disease.

5.  Mild bronchospasm-resolved.



PLAN:  The patient appears very comfortable this morning.  He is not short

of breath at rest.  He does state to feeling much, much better overall.  On

physical exam, his bronchospasm has resolved.  In addition, the

alveolar-arterial gradient also has resolved.  I will continue with the

current nebulizer treatments and oral steroids for now.  The patient

remains off antibiotic therapy.  There are no temperatures noted.  There is

no leukocytosis.



Clinical status of the patient is significantly improved - compared to his

initial presentation.  However, again, unfortunately, the overall/future

status for this patient does remain poor.  All are aware.  The patient is

for discharge in the near future.  I will discuss the above with the

attending physician.







__________________________________________

Bennie Shepherd MD

DD:  10/24/2018 7:08:09

DT:  10/24/2018 7:09:50

Job # [id number]

HEMA

## 2018-10-25 NOTE — DS
HISTORY OF PRESENT ILLNESS:  The patient was transferred to [facility].  The patient did well with physical therapy and

occupational therapy.  He was able to use less supplemental oxygen

while in the hospital.  He has finished his course of antibiotics and

prednisone.  He did not receive any insulin while in the hospital, he

is on coverage.  The patient did well.  He was able to be tapered off

his antibiotics.  He is on a small dose of prednisone which will be

stopped.  He has an appointment to see  _____ as an outpatient

today.  He will start his chemotherapy with  _____ for his squamous

cell cancer of the lung.



PHYSICAL EXAMINATION:

GENERAL:  The patient is awake, alert and oriented x3.

CHEST:  Has no breath sounds in the left and normal breath sounds in

the right.

HEART:  Mild tachycardia.  He is using the oxygen.

ABDOMEN:  Soft.

EXTREMITIES:  Without cyanosis, clubbing or edema.

NEUROLOGIC:  Grossly intact.



IMPRESSION:  The patient was discharged home in improved condition.



FINAL DISCHARGE DIAGNOSES:  Squamous cell cancer of the lung,

pneumonia, chronic obstructive pulmonary disease, hypoxia and

non-insulin independent diabetes mellitus.







__________________________________________

Rodney Lawrence MD





DD:  10/24/2018 8:55:21

DT:  10/24/2018 10:46:01

Job # [id number]

## 2018-10-30 ENCOUNTER — HOSPITAL ENCOUNTER (INPATIENT)
Dept: HOSPITAL 42 - ED | Age: 68
LOS: 6 days | Discharge: HOME | DRG: 191 | End: 2018-11-05
Attending: INTERNAL MEDICINE | Admitting: INTERNAL MEDICINE
Payer: MEDICARE

## 2018-10-30 VITALS — BODY MASS INDEX: 21.4 KG/M2

## 2018-10-30 DIAGNOSIS — J44.1: Primary | ICD-10-CM

## 2018-10-30 DIAGNOSIS — Z21: ICD-10-CM

## 2018-10-30 DIAGNOSIS — C34.91: ICD-10-CM

## 2018-10-30 DIAGNOSIS — Z51.5: ICD-10-CM

## 2018-10-30 DIAGNOSIS — I11.9: ICD-10-CM

## 2018-10-30 DIAGNOSIS — E11.9: ICD-10-CM

## 2018-10-30 DIAGNOSIS — Z66: ICD-10-CM

## 2018-10-30 DIAGNOSIS — Z90.2: ICD-10-CM

## 2018-10-30 DIAGNOSIS — J96.11: ICD-10-CM

## 2018-10-30 DIAGNOSIS — J98.11: ICD-10-CM

## 2018-10-30 DIAGNOSIS — Z99.81: ICD-10-CM

## 2018-10-30 DIAGNOSIS — Z87.891: ICD-10-CM

## 2018-10-30 DIAGNOSIS — J90: ICD-10-CM

## 2018-10-30 DIAGNOSIS — I51.7: ICD-10-CM

## 2018-10-30 DIAGNOSIS — Z87.01: ICD-10-CM

## 2018-10-30 LAB
ALBUMIN SERPL-MCNC: 3.5 G/DL (ref 3–4.8)
ALBUMIN/GLOB SERPL: 1 {RATIO} (ref 1.1–1.8)
ALT SERPL-CCNC: 43 U/L (ref 7–56)
APTT BLD: 29.8 SECONDS (ref 25.1–36.5)
ARTERIAL BLOOD GAS HEMOGLOBIN: 14.9 G/DL (ref 11.7–17.4)
ARTERIAL BLOOD GAS O2 SAT: 92.1 % (ref 95–98)
ARTERIAL BLOOD GAS PCO2: 54 MM/HG (ref 35–45)
ARTERIAL BLOOD GAS TCO2: 25.9 MMOL.L (ref 22–28)
AST SERPL-CCNC: 41 U/L (ref 17–59)
BASOPHILS # BLD AUTO: 0.05 K/MM3 (ref 0–2)
BASOPHILS NFR BLD: 0.4 % (ref 0–3)
BUN SERPL-MCNC: 24 MG/DL (ref 7–21)
CALCIUM SERPL-MCNC: 8.5 MG/DL (ref 8.4–10.5)
EOSINOPHIL # BLD: 0.3 10*3/UL (ref 0–0.7)
EOSINOPHIL NFR BLD: 2.8 % (ref 1.5–5)
ERYTHROCYTE [DISTWIDTH] IN BLOOD BY AUTOMATED COUNT: 14 % (ref 11.5–14.5)
GFR NON-AFRICAN AMERICAN: > 60
GRANULOCYTES # BLD: 7.68 10*3/UL (ref 1.4–6.5)
GRANULOCYTES NFR BLD: 62.1 % (ref 50–68)
HCO3 BLDA-SCNC: 24.2 MMOL/L (ref 21–28)
HGB BLD-MCNC: 16 G/DL (ref 14–18)
INHALED O2 CONCENTRATION: 100 %
INR PPP: 1.1
LYMPHOCYTES # BLD: 3.8 10*3/UL (ref 1.2–3.4)
LYMPHOCYTES NFR BLD AUTO: 30.8 % (ref 22–35)
MCH RBC QN AUTO: 23.5 PG (ref 25–35)
MCHC RBC AUTO-ENTMCNC: 30.6 G/DL (ref 31–37)
MCV RBC AUTO: 76.7 FL (ref 80–105)
MONOCYTES # BLD AUTO: 0.5 10*3/UL (ref 0.1–0.6)
MONOCYTES NFR BLD: 3.9 % (ref 1–6)
O2 CAP BLDA-SCNC: 20.2 ML/DL (ref 16–24)
O2 CT BLDA-SCNC: 18.6 ML/DL (ref 15–23)
PH BLDA: 7.26 [PH] (ref 7.35–7.45)
PLATELET # BLD: 172 10^3/UL (ref 120–450)
PMV BLD AUTO: 11.3 FL (ref 7–11)
PO2 BLDA: 56 MM/HG (ref 80–100)
PROTHROMBIN TIME: 12.7 SECONDS (ref 9.4–12.5)
RBC # BLD AUTO: 6.82 10^6/UL (ref 3.5–6.1)
WBC # BLD AUTO: 12.4 10^3/UL (ref 4.5–11)

## 2018-10-30 RX ADMIN — INSULIN LISPRO SCH: 100 INJECTION, SOLUTION INTRAVENOUS; SUBCUTANEOUS at 22:00

## 2018-10-30 RX ADMIN — EFAVIRENZ, EMTRICITABINE, AND TENOFOVIR DISOPROXIL FUMARATE SCH TAB: 600; 200; 300 TABLET, FILM COATED ORAL at 23:14

## 2018-10-30 RX ADMIN — OXYCODONE AND ACETAMINOPHEN PRN TAB: 10; 325 TABLET ORAL at 15:31

## 2018-10-30 RX ADMIN — IPRATROPIUM BROMIDE AND ALBUTEROL SULFATE SCH ML: .5; 3 SOLUTION RESPIRATORY (INHALATION) at 20:12

## 2018-10-30 RX ADMIN — OXYCODONE AND ACETAMINOPHEN PRN TAB: 10; 325 TABLET ORAL at 23:15

## 2018-10-30 RX ADMIN — PIPERACILLIN AND TAZOBACTAM SCH MLS/HR: 3; .375 INJECTION, POWDER, LYOPHILIZED, FOR SOLUTION INTRAVENOUS; PARENTERAL at 22:11

## 2018-10-30 RX ADMIN — Medication SCH MG: at 19:00

## 2018-10-30 NOTE — CT
Date of service: 



10/30/2018



PROCEDURE:  CT Chest with contrast (Pulmonary Angiogram)



HISTORY:

pulmonary embolism



COMPARISON:

None available.



TECHNIQUE:

Axial computed tomography images were obtained of the chest in the 

pulmonary arterial phase of enhancement. Coronal and sagittal 

reformatted images were created and reviewed.



Intravenous contrast dose: Omnipaque 350, 100 cc



Radiation dose:



Total exam DLP = 431.87 mGy-cm.



This CT exam was performed using one or more of the following dose 

reduction techniques: Automated exposure control, adjustment of the 

mA and/or kV according to patient size, and/or use of iterative 

reconstruction technique.



FINDINGS:



PULMONARY ARTERIES:

Prior left pulmonectomy reiterated.  No definite pulmonary embolus 

appreciated in the remaining pulmonary arterial distribution right 

lung and right main pulmonary artery and main pulmonary artery. 



AORTA:

The ascending thoracic aorta remains dilated to 4.1 cm with remainder 

normal caliber.  Limited atherosclerotic calcified plaque is seen 

associated with the arch primarily. 



LUNGS:

Dense atelectasis affects the right lower lobe likely postobstructive 

related to a large right hilar/infrahilar mass measures approximate 

9.6 x 9.2 cm.  Extensive emphysematous changes seen in the unaffected 

right upper lobe with motion artifacts degrade the quality exam 

significantly.  Compression atelectasis is favored over infiltrates 

at the intervening lung between well aerated and densely atelectatic 

right lower and middle lobes. 



PLEURAL SPACES:

Minimal right pleural effusion is identified.  Limited fluid is seen 

the left hemithorax post pneumonectomy. 



HEART:

Cardiomegaly is reiterated.  No significant pericardial effusion. 



LYMPH NODES:

Lymphadenopathy is not excluded the right hilar region with a sub 

carinal space in fact.  No definite significant paratracheal 

lymphadenopathy.



BONES, CHEST WALL:

Unremarkable. No no acute fracture or destructive lesion.  Third left 

rib old healed fracture reiterated.



OTHER FINDINGS:

Unremarkable. 



IMPRESSION:

1. No CT evidence of pulmonary embolus. 



2. Large right perihilar/infrahilar mass reiterated with dense 

atelectasis affecting the right middle and lower lobes.  Likely 

compressive atelectasis is seen at intervening lung between the mass 

and well aerated right upper lobe, favored over infiltrates. 



3. Extensive emphysema right upper lobe.  Restrained motion degrades 

quality exam.  Prior left pneumonectomy.  Limited right pleural 

effusion. 



4. Cardiomegaly reiterated.

## 2018-10-30 NOTE — CARD
--------------- APPROVED REPORT --------------





Date of service: 10/30/2018



EKG Measurement

Heart [id number]

VA [id number]

[id number]

[id number]

[id number]



<Conclusion>

Sinus tachycardia

Rightward axis

Nonspecific ST and T wave abnormality

Abnormal ECG

## 2018-10-30 NOTE — ED PDOC
Arrival/HPI





- General


Chief Complaint: Respiratory Distress


Time Seen by Provider: 10/30/18 08:44


Historian: EMS





- History of Present Illness


Narrative History of Present Illness (Text): 





10/30/18 08:40


68 M with PMHx of lung ca on home o2 3L, L Lobectomy, HTN and HIV, NKDA, brought

in to the emergency department by EMS, accompanied by ex wife, for shortness of 

breath about 20-25 minutes prior to arrival.  EMS reports auditory wheezes and 

diaphoresis. Patient denies intubation, POA: daughter contacted who confirms DNR

and DNI. No other complaints reported, 





HPI limited due to patient condition. 





PMD: Dr. Lawrence 


[place] [place] [place]








Time/Duration: Prior to Arrival


Symptom Onset: Sudden


Symptom Course: Unchanged


Activities at Onset: Light


Context: Home





Past Medical History





- Infectious Disease


Hx of Infectious Diseases: None





- Cardiac


Hx Hypertension: Yes





- Pulmonary


Hx Lung Cancer: Yes


Hx Pneumonia: Yes


Other/Comment: Left pneumonectomy.  Mass noted to the Rt. lung





- Neurological


Hx Neurological Disorder: No





- HEENT


Hx HEENT Disorder: Yes (eyeglasses)





- Renal


Hx Renal Disorder: No





- Endocrine/Metabolic


Hx Diabetes Mellitus Type 2: Yes





- Hematological/Oncological


Hx Blood Disorders: Yes


Hx Cancer: Yes (left lung/right lung)


Hx Hepatitis C: Yes


Other/Comment: HIV - count is undetectable, left lobectomy 2014, recently dx 

with mass to right lung 9cm was to start chemotherapy next week





- Integumentary


Hx Dermatological Disorder: Yes


Other/Comment: multiple tatoos





- Musculoskeletal/Rheumatological


Hx Musculoskeletal Disorders: No


Hx Falls: No





- Gastrointestinal


Hx Gastrointestinal Disorders: Yes (weight loss, appetite good)





- Genitourinary/Gynecological


Hx Genitourinary Disorders: No





- Psychiatric


Hx Emotional Abuse: No


Hx Physical Abuse: No


Hx Substance Use: No





- Surgical History


Other/Comment: left pneumonectomy, b/l meniscus sx around the 1990's 1 year apar

t, ct guided lung bx 10/3/18





- Anesthesia


Hx Anesthesia Reactions: No





- Suicidal Assessment


Feels Threatened In Home Enviroment: No





Family/Social History


Family/Social History: No Known Family HX


Smoking Status: Former Smoker


Hx Alcohol Use: No


Hx Substance Use: No





Allergies/Home Meds


Allergies/Adverse Reactions: 


Allergies





No Known Allergies Allergy (Verified 10/08/18 07:29)


   








Home Medications: 


                                    Home Meds











 Medication  Instructions  Recorded  Confirmed


 


Albuterol Sulfate [Ventolin Hfa] 1 puff IH QID 10/01/18 10/30/18


 


Breo Ellipta 100-25 Mcg INH 1 puff INH BID 10/01/18 10/30/18


 


Ibuprofen/Diphenhydramine Cit 1 each PO HS 10/01/18 10/30/18





[Advil Pm Caplet]   


 


RX: Efavirenz/Emtricitabine/Teno 1 tab PO DAILY 10/01/18 10/30/18





[Atripla 600 MG-200 MG-300 MG]   


 


RX: Multivitamin/Iron/Folic Acid 1 tab PO DAILY 10/01/18 10/30/18





[Centrum Adults Tablet]   


 


RX: amLODIPine [Norvasc] 5 mg PO DAILY 10/01/18 10/30/18


 


RX: diaZEpam [Valium] 5 mg PO HS 10/01/18 10/30/18


 


RX: oxyCODONE/Acetaminophen 1 tab PO PRN PRN 10/01/18 10/30/18





[Percocet 5/325 mg Tab]   


 


Naproxen [Naprosyn Tab] 200 mg PO DAILY 10/30/18 10/30/18


 


RX: Omeprazole 0 mg PO DAILY 10/30/18 10/30/18














Review of Systems





- Physician Review


All systems were reviewed & negative as marked: Yes (All other systems negative 

except that noted in the HPI.)





Physical Exam





- Physical Exam


Narrative Physical Exam (Text): 





PE: 


Gen:  VS reviewed, alert, well developed, well nourished, nontoxic, mild 

distress. Diaphoretic.


Eye: EOMI, PERRL 


Neck: no JVD, supple, no adenopathy


CV: Tachycardic heart rhythm. 


Pulm: Severe respiratory distress. Accessory muscle use. Diffuse course of 

breath sounds. Fair air exchange bilaterally. Tachypneic.


Abd: soft, nontender, no guarding, no rebound, no rigidity


Ext: no edema


Skin: good color, no rash, no cyanosis


Psych: responds appropriately to questions, normal affect


Neuro: oriented x3, CN2-12 intact grossly, motor intact, sensation intact








Respiratory Rate: Normal





Medical Decision Making


ED Course and Treatment: 





10/30/18 08:40


Impression: 68 M brought in to the emergency department by EMS for shortness of 

breath that started about 20-25 minutes prior to arrival.





Differential Diagnosis included but are not limited to:  





Plan:


-- Labs


-- CT of Angio chest PE protocol


-- EKG


-- X-ray of chest


-- SOLU-Medrol


-- Vancomycin Inj


-- Xopenex


-- Zosyn IVPB


-- Blood culture


-- Cardiac Monitor


-- BIPAP/CPAP setting adjustment once 


-- Reassess and disposition





Prior Visits:


Notes and results from previous visits were reviewed. Patient was last seen in 

the emergency department on10/08/18 for shortness of breath. Patient was 

hospitalized in ICU in guarded condition, with diagnosis of lung mass and 

pneumonia. 





Progress Notes:


10/30/18 08:51


I discussed plan of care with daughter, Lucy Mejia via phone, she states along 

with other family members have legal power of . it was discussed and 

confirmed that the patient is DNR as well as DNI. This is concordance with the 

patient's wishes. Furthermore, the patient also verbally states he does not want

to be intubated.





10/30/18 10:20


re-eval, patient appears more comfortable from a respiratory standpoint, bipap 

still ongoing, patient is less tachypneic without very mild accessory muscle us

e, on lung exam breath sounds are clear with good air exchange bilaterally





10/30/18 10:40


Case discussed with Dr. Lawrence, who accepts admission to telemetry. Patient 

to be admitted for empiric antibiotics for COPD exacerbation/ worsening pleural 

effusion. 











- Critical Care


Critical Care Minutes: 30 minutes





- RAD Interpretation


Narrative RAD Interpretations (Text): 





CT of chest reviewed by radiologist, shows:


Dictator : Jean Carlos Shelton MD


Report Date : 10/30/2018 10:57:27





FINDINGS:


PULMONARY ARTERIES:


Prior left pulmonectomy reiterated.  No definite pulmonary embolus appreciated 

in the remaining pulmonary arterial distribution right lung and right main 

pulmonary artery and main pulmonary artery. 





AORTA:


The ascending thoracic aorta remains dilated to 4.1 cm with remainder normal 

caliber.  Limited atherosclerotic calcified plaque is seen associated with the 

arch primarily. 





LUNGS:


Dense atelectasis affects the right lower lobe likely postobstructive related to

a large right hilar/infrahilar mass measures approximate 9.6 x 9.2 cm.  

Extensive emphysematous changes seen in the unaffected right upper lobe with 

motion artifacts degrade the quality exam significantly.  Compression 

atelectasis is favored over infiltrates at the intervening lung between well 

aerated and densely atelectatic right lower and middle lobes. 





PLEURAL SPACES:


Minimal right pleural effusion is identified.  Limited fluid is seen the left 

hemithorax post pneumonectomy. 





HEART:


Cardiomegaly is reiterated.  No significant pericardial effusion. 





LYMPH NODES:


Lymphadenopathy is not excluded the right hilar region with a sub carinal space 

in fact.  No definite significant paratracheal lymphadenopathy.





BONES, CHEST WALL:


Unremarkable. No no acute fracture or destructive lesion.  Third left rib old 

healed fracture reiterated.





OTHER FINDINGS:


Unremarkable. 





IMPRESSION:


1. No CT evidence of pulmonary embolus. 





2. Large right perihilar/infrahilar mass reiterated with dense atelectasis 

affecting the right middle and lower lobes.  Likely compressive atelectasis is 

seen at intervening lung between the mass and well aerated right upper lobe, 

favored over infiltrates. 





3. Extensive emphysema right upper lobe.  Restrained motion degrades quality 

exam.  Prior left pneumonectomy.  Limited right pleural effusion. 





4. Cardiomegaly reiterated.





 

--------------------------------------------------------------------------------


---------------------------------------------------------


X-ray of chest reviewed by radiologist, shows:


Dictator : Jean Carlos Shelton MD


Report Date : 10/30/2018 09:23:58





FINDINGS:


LUNGS:


Post left pneumonectomy changes reiterated.  Extensive dense reticular nodular 

infiltrate is increased at the mid to inferior right lung zones representing 

interval worsening.  No definite pneumothorax bilaterally.  Large medial basilar

mass obscured by the heart.  Right pleural effusion is mild but definite.





PLEURA:


As above.





CARDIOVASCULAR:


No aortic atherosclerotic calcification present.





Evaluation for pulmonary vascular congestion is limited bio bilateral opacity.  

Cardiac size likely stable but also limited. 





OSSEOUS STRUCTURES:


No significant abnormalities.





VISUALIZED UPPER ABDOMEN:


Normal.





OTHER FINDINGS:


None.





IMPRESSION:


Post left pneumonectomy changes are reiterated with mild right pleural effusion 

likely increased.  Coarse reticular infiltrates are increasing at the right 

chest now sparing only the right apex.  No pneumothorax bilaterally.





Radiology Orders: 











10/30/18 08:48


CHEST PORTABLE [RAD] Stat 





10/30/18 08:49


ANGIO CHEST PE PROTOCOL [CT] Stat 











: Radiologist





- Medication Orders


Current Medication Orders: 














Discontinued Medications





Levalbuterol HCl (Xopenex)  1.25 mg IH STAT STA


   Stop: 10/30/18 08:51


Methylprednisolone (Solu-Medrol)  125 mg IVP STAT STA


   Stop: 10/30/18 08:51











- Scribe Statement


The provider has reviewed the documentation as recorded by the Scribe





Fiona Pérez 





All medical record entries made by the Scribe were at my direction and 

personally dictated by me. I have reviewed the chart and agree that the record 

accurately reflects my personal performance of the history, physical exam, me

dical decision making, and the department course for this patient. I have also 

personally directed, reviewed, and agree with the discharge instructions and 

disposition.





Disposition/Present on Arrival





- Present on Arrival


Any Indicators Present on Arrival: No


History of DVT/PE: No


History of Uncontrolled Diabetes: No


Urinary Catheter: No


History of Decub. Ulcer: No


History Surgical Site Infection Following: None





- Disposition


Have Diagnosis and Disposition been Completed?: Yes


Diagnosis: 


 COPD (chronic obstructive pulmonary disease)





Disposition: HOSPITALIZED


Disposition Time: 10:40


Patient Plan: Admission


Condition: STABLE

## 2018-10-30 NOTE — RAD
Date of service: 



10/30/2018



HISTORY:

 pneumonia 



COMPARISON:

Portable chest 10/10/2018. 



FINDINGS:



LUNGS:

Post left pneumonectomy changes reiterated.  Extensive dense 

reticular nodular infiltrate is increased at the mid to inferior 

right lung zones representing interval worsening.  No definite 

pneumothorax bilaterally.  Large medial basilar mass obscured by the 

heart.  Right pleural effusion is mild but definite.



PLEURA:

As above.



CARDIOVASCULAR:

No aortic atherosclerotic calcification present.



Evaluation for pulmonary vascular congestion is limited bio bilateral 

opacity.  Cardiac size likely stable but also limited. 



OSSEOUS STRUCTURES:

No significant abnormalities.



VISUALIZED UPPER ABDOMEN:

Normal.



OTHER FINDINGS:

None.



IMPRESSION:

Post left pneumonectomy changes are reiterated with mild right 

pleural effusion likely increased.  Coarse reticular infiltrates are 

increasing at the right chest now sparing only the right apex.  No 

pneumothorax bilaterally.

## 2018-10-31 RX ADMIN — OXYCODONE AND ACETAMINOPHEN PRN TAB: 10; 325 TABLET ORAL at 14:05

## 2018-10-31 RX ADMIN — Medication SCH MG: at 10:08

## 2018-10-31 RX ADMIN — INSULIN LISPRO SCH UNITS: 100 INJECTION, SOLUTION INTRAVENOUS; SUBCUTANEOUS at 18:20

## 2018-10-31 RX ADMIN — OXYCODONE AND ACETAMINOPHEN PRN TAB: 10; 325 TABLET ORAL at 22:03

## 2018-10-31 RX ADMIN — INSULIN LISPRO SCH: 100 INJECTION, SOLUTION INTRAVENOUS; SUBCUTANEOUS at 22:00

## 2018-10-31 RX ADMIN — IPRATROPIUM BROMIDE AND ALBUTEROL SULFATE SCH: .5; 3 SOLUTION RESPIRATORY (INHALATION) at 02:27

## 2018-10-31 RX ADMIN — INSULIN LISPRO SCH UNITS: 100 INJECTION, SOLUTION INTRAVENOUS; SUBCUTANEOUS at 14:07

## 2018-10-31 RX ADMIN — EFAVIRENZ, EMTRICITABINE, AND TENOFOVIR DISOPROXIL FUMARATE SCH TAB: 600; 200; 300 TABLET, FILM COATED ORAL at 22:03

## 2018-10-31 RX ADMIN — IPRATROPIUM BROMIDE AND ALBUTEROL SULFATE SCH ML: .5; 3 SOLUTION RESPIRATORY (INHALATION) at 08:22

## 2018-10-31 RX ADMIN — Medication SCH MG: at 18:16

## 2018-10-31 RX ADMIN — IPRATROPIUM BROMIDE AND ALBUTEROL SULFATE SCH ML: .5; 3 SOLUTION RESPIRATORY (INHALATION) at 19:56

## 2018-10-31 RX ADMIN — BUDESONIDE SCH MG: 0.5 SUSPENSION RESPIRATORY (INHALATION) at 08:22

## 2018-10-31 RX ADMIN — PIPERACILLIN AND TAZOBACTAM SCH MLS/HR: 3; .375 INJECTION, POWDER, LYOPHILIZED, FOR SOLUTION INTRAVENOUS; PARENTERAL at 06:29

## 2018-10-31 RX ADMIN — INSULIN LISPRO SCH: 100 INJECTION, SOLUTION INTRAVENOUS; SUBCUTANEOUS at 10:08

## 2018-10-31 RX ADMIN — METHYLPREDNISOLONE SODIUM SUCCINATE SCH MG: 40 INJECTION, POWDER, FOR SOLUTION INTRAMUSCULAR; INTRAVENOUS at 22:05

## 2018-10-31 RX ADMIN — OXYCODONE AND ACETAMINOPHEN PRN TAB: 10; 325 TABLET ORAL at 06:56

## 2018-10-31 RX ADMIN — IPRATROPIUM BROMIDE AND ALBUTEROL SULFATE SCH ML: .5; 3 SOLUTION RESPIRATORY (INHALATION) at 14:01

## 2018-10-31 RX ADMIN — BUDESONIDE SCH MG: 0.5 SUSPENSION RESPIRATORY (INHALATION) at 19:56

## 2018-10-31 RX ADMIN — METHYLPREDNISOLONE SODIUM SUCCINATE SCH MG: 40 INJECTION, POWDER, FOR SOLUTION INTRAMUSCULAR; INTRAVENOUS at 10:09

## 2018-10-31 NOTE — HP
HISTORY OF PRESENT ILLNESS:  A 68-year-old white male with history of

HIV positive, on Atripla.  The patient has had a remote history of left

lung CA status post treatment with a left lobectomy, a recent diagnosis

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

adenopathy.  The patient was recently hospitalized to [facility]

and was discharged a week ago after being treated for exacerbation of

COPD with pneumonia and pleural effusion and lung cancer.  The patient

was discharged a week ago, started chemotherapy with Dr. Reynoso in

[place].  On the day prior to admission to this admission, patient

had done too much activity at home, became acutely short of breath, was

unable to breathe, desaturated and was transported to the hospital.  The

patient was brought to the emergency room.  On the day of admission, he

was severely hypoxic and severely short of breath and tachycardic.  A

CAT scan was done, there was no evidence of a clot, severe COPD and

aggressive lung cancer.  The patient was admitted because of severe

hypoxia and shortness of breath.  The patient denies fever, chills,

sputum production, cough, etc.  The patient only complains of severe

fatigue and shortness of breath.  The patient denies any chest pain,

palpitations, nausea, vomiting, lightheadedness or dizziness.  The

patient denies any GI symptoms of diarrhea, vomiting or nausea.  The

patient denies any CNS symptoms of headache, lightheadedness, dizziness,

syncope, loss of strength or sensation in the upper and lower

extremities.  The patient has a remote history of tobacco abuse and not

smoked.  He does have a history of being HIV positive for many years and

being treated with Atripla and doing well.  The patient has no recent

travel history.  Does not use illicit drugs.  He is a nonsmoker at this

point.



ALLERGIES:  THERE ARE NO KNOWN ALLERGIES.



PHYSICAL EXAMINATION:

GENERAL:  Shows a well-developed thin white male, comfortable in bed.

VITAL SIGNS:  Stable the following morning at 97.5, heart rate 96 and

blood pressure 126/90.

HEART:  Regular sinus rhythm with _____ murmurs.

CHEST:  Shows decreased breath sounds at the left entire thorax and

rhonchi in the right thorax without wheezing.

ABDOMEN:  Soft.  No hepatosplenomegaly.  No organomegaly.

EXTREMITIES:  Without cyanosis, clubbing or edema.

NEUROLOGIC:  Sensation is grossly intact.



LABORATORY DATA:  Unremarkable except for a mildly elevated white count

of 12.2, hemoglobin of 16.  BUN and creatinine are 24 and 1.2.  Blood

sugar is 249.



The patient does have a history of being non-insulin-dependent diabetes

mellitus, well controlled at home.



IMPRESSION:  This is a 68-year-old white male presenting with hypoxia,

shortness of breath, history of lung cancer, history of lobectomy,

history of human immunodeficiency virus positive, history for severe

chronic obstructive pulmonary disease.







__________________________________________

Rodney Lawrence MD





DD:  10/31/2018 8:49:01

DT:  10/31/2018 10:28:22

Job # [id number]

## 2018-10-31 NOTE — CON
DATE:  10/31/2018



PULMONARY CONSULTATION



REASON FOR PULMONARY CONSULTATION:  Respiratory failure.



REFERRING PHYSICIAN:  Dr. Lawrence.



HISTORY OF PRESENT ILLNESS:  The patient is a chronically ill 68-year-old

male, with past medical history significant for recent pneumonia,

extensive/advanced lung cancer (right lung), status post left pneumonectomy

(2014), advanced chronic obstructive pulmonary disease, on home oxygen,

hypertension, HIV positivity, who presents to [facility] with a

1-day history of increasing shortness of breath at rest and dyspnea on

exertion.  The patient also states to a minimal cough with occasional

sputum production.  There is no history of chest pain, coughing up of

blood, or chest pain - made worse with deep respirations.  There is no

history of temperatures, chills or infectious exposure.  There is no

history of night sweats.  The patient has lost weight with decreased

appetite over the past 6 months.  No history of leg or calf pains.  No

history of syncope or diaphoresis.  No history of recent travel or trauma.



REVIEW OF SYSTEMS:  No history of nausea, vomiting, diarrhea.  No acute

urinary symptoms.  No new neurologic complaints.  Rest of the review of

systems negative.



ALLERGIES:  NO KNOWN ALLERGIES.



SOCIAL HISTORY:  Positive for extensive tobacco usage.  No alcohol.



FAMILY HISTORY:  No inheritable diseases.



HOME MEDICATIONS:  Include Percocet, Valium, Norvasc, Protonix, Naprosyn,

efavirenz, calcium, Breo Ellipta, DuoNebs, Tylenol.



PHYSICAL EXAMINATION:

GENERAL:  The patient appears comfortable this morning.  He is not short of

breath at rest.

VITAL SIGNS:  Temperature is 97.5, pulse 96, respirations 19, blood

pressure 126/90.  Oxygen saturation on nasal cannula is 93%.

HEENT:  Normocephalic, atraumatic.  No JVD.

CARDIOVASCULAR:  Positive S1, S2.  No S3 gallop.

LUNGS:  Right lung - mild rhonchi.  No wheezing.  Decreased breath sounds -

left lung.

EXTREMITIES:  No clubbing, cyanosis or edema.  Calves are nontender to

palpation.

GI:  Abdomen is soft, nontender and nondistended.  Bowel sounds are

positive.

SKIN:  No acute rash.

NEUROLOGIC:  Exam limited at the present time.



PERTINENT LABORATORY DATA:  CAT scan of the chest was done as an angiogram

protocol.  There is no evidence of pulmonary embolism.  There is a very

large right perihilar/infrahilar mass (seen on previous CAT scans), with

dense atelectasis affecting the right middle and lower lobes.  There is

extensive emphysema noted in the right upper lobe.  There are no

significant infiltrates - consistent with pneumonia.  The patient is status

post left pneumonectomy.  CBC:  White count 12.4K, hemoglobin 16.0,

hematocrit 52.3, platelets of 172,000.  Arterial blood gas was done on 100%

oxygen.  Results are:  PH 7.26, pCO2 of 54, pO2 of 56.  Complete metabolic

profile:  BUN 24, glucose 249.  Rest of the metabolic profile is within

normal limits.



IMPRESSION:

1.  Recurrent respiratory failure.

2.  Advanced/extensive lung cancer.

3.  Advanced chronic obstructive pulmonary disease.

4.  Mild bronchospasm.



PLAN:  I did discuss the case with the night nurse at length.  I have also

discussed the case with the patient at length, and reviewed the chart at

length.



The patient presents to [facility] with a 1-day history of

worsening pulmonary symptoms.  I did review the CT scan of the chest - done

as an angiogram protocol.  Findings are noted above.  As above, the patient

is status post left pneumonectomy.  There is a very large right

suprahilar/infrahilar mass, causing atelectasis of the right middle and

right lower lobes.  The right upper lobe shows extensive emphysema, but no

definite infiltrates.  I have also reviewed the arterial blood gas.  A

respiratory acidosis with a significant increase in the alveolar-arterial

gradient is noted.  The patient does feel better this morning.  He is

saturating at 93% on nasal cannula.  On physical exam, the patient is in

mild-to-moderate bronchospasm.  I will continue with the current nebulizer

treatments and add intravenous steroids this morning.  I will also add

inhaled Pulmicort.  The patient is on Breo-elipta at home.



Again, the patient does feel significantly better this morning - compared

to yesterday.  However, unfortunately, the overall status/prognosis for

this patient remains very, very poor.  Oncology evaluation with Dr. Reynoso

has been ordered.  The patient is currently a DNR/DNR status.  I will

discuss the above with the attending physician.



Thank you very much for this Pulmonary consultation.







__________________________________________

Bennie Shepherd MD





DD:  10/31/2018 7:07:08

DT:  10/31/2018 7:12:17

[facility] # [id number]



MTDALY

## 2018-11-01 RX ADMIN — INSULIN LISPRO SCH: 100 INJECTION, SOLUTION INTRAVENOUS; SUBCUTANEOUS at 08:44

## 2018-11-01 RX ADMIN — OXYCODONE AND ACETAMINOPHEN PRN TAB: 10; 325 TABLET ORAL at 22:34

## 2018-11-01 RX ADMIN — BUDESONIDE SCH MG: 0.5 SUSPENSION RESPIRATORY (INHALATION) at 20:51

## 2018-11-01 RX ADMIN — Medication SCH MG: at 18:38

## 2018-11-01 RX ADMIN — INSULIN LISPRO SCH: 100 INJECTION, SOLUTION INTRAVENOUS; SUBCUTANEOUS at 22:08

## 2018-11-01 RX ADMIN — METHYLPREDNISOLONE SODIUM SUCCINATE SCH MG: 40 INJECTION, POWDER, FOR SOLUTION INTRAMUSCULAR; INTRAVENOUS at 11:13

## 2018-11-01 RX ADMIN — IPRATROPIUM BROMIDE AND ALBUTEROL SULFATE SCH ML: .5; 3 SOLUTION RESPIRATORY (INHALATION) at 13:13

## 2018-11-01 RX ADMIN — IPRATROPIUM BROMIDE AND ALBUTEROL SULFATE SCH: .5; 3 SOLUTION RESPIRATORY (INHALATION) at 07:39

## 2018-11-01 RX ADMIN — IPRATROPIUM BROMIDE AND ALBUTEROL SULFATE SCH ML: .5; 3 SOLUTION RESPIRATORY (INHALATION) at 20:51

## 2018-11-01 RX ADMIN — IPRATROPIUM BROMIDE AND ALBUTEROL SULFATE SCH: .5; 3 SOLUTION RESPIRATORY (INHALATION) at 02:27

## 2018-11-01 RX ADMIN — OXYCODONE AND ACETAMINOPHEN PRN TAB: 10; 325 TABLET ORAL at 08:53

## 2018-11-01 RX ADMIN — INSULIN LISPRO SCH: 100 INJECTION, SOLUTION INTRAVENOUS; SUBCUTANEOUS at 14:57

## 2018-11-01 RX ADMIN — Medication SCH MG: at 11:13

## 2018-11-01 RX ADMIN — OXYCODONE AND ACETAMINOPHEN PRN TAB: 10; 325 TABLET ORAL at 14:46

## 2018-11-01 RX ADMIN — BUDESONIDE SCH: 0.5 SUSPENSION RESPIRATORY (INHALATION) at 07:39

## 2018-11-01 RX ADMIN — METHYLPREDNISOLONE SODIUM SUCCINATE SCH MG: 40 INJECTION, POWDER, FOR SOLUTION INTRAMUSCULAR; INTRAVENOUS at 22:33

## 2018-11-01 RX ADMIN — INSULIN LISPRO SCH UNITS: 100 INJECTION, SOLUTION INTRAVENOUS; SUBCUTANEOUS at 18:39

## 2018-11-01 RX ADMIN — EFAVIRENZ, EMTRICITABINE, AND TENOFOVIR DISOPROXIL FUMARATE SCH TAB: 600; 200; 300 TABLET, FILM COATED ORAL at 22:34

## 2018-11-01 NOTE — CP.PCM.PCO
<Roger Barrientos - Last Filed: 11/01/18 22:12>





Physician Communication Note





- Physician Communication Note


Physician Communication Note: Pt has been requesting valium 5 nightly for last 2

nights; will adm x1 TN





<Vasyl Kenyon - Last Filed: 11/02/18 04:27>





Attending/Attestation





- Attestation


I have personally seen and examined this patient.: No


I have fully participated in the care of the patient.: Yes


I have reviewed all pertinent clinical information: Yes

## 2018-11-01 NOTE — CP.PCM.CON
History of Present Illness





- History of Present Illness


History of Present Illness: 





Palliative consult requested by Dr EDGAR Lawrence


Reason: Goals of care





This is a 68 year old patient with history of metastatic SC lung cancer, HTN, 

HIV who presented to ED yesterday with increased shortness sof breath and 

hypoxia.





Chest x ray: s/p left pneumonectomy changes, mild right pleural effusion. Reti

cular infiltrates right chest, no pneumothorax bilaterally 


Chest CT:  No PE, large right perihilar/infrahilar mass,dense atelectasis in 

right middle/upper lobes, extensive emphysema RUL.


EKG: SR, rightward wave axis, NS ST/T wave changes


Labs: Wbc 12.4 Hgb 16 ,[id number] , Na 139, K 5.0, Bun 24, Crt 1.2, glucose 190, Oli

8.9, Ast 41, ALt 43, TP 7.2, albumin 3.5 








PMHx: HTN, HIV, DM,lung cancer s/p [place] pneumonectmy [place] [address], new squamous cell

right lung lesion, s/p chemotherapy. 





Social History: Former heavy smoker, no alcohol or drug use. Retired, lives with

family





Family History: Non contributory.





Advance Care Planning: The patient has a POLST:DNR/DNI. A copy is in the chart. 





Review of Systems:As per HPI, 12 point review otherwise negative





Past Patient History





- Infectious Disease


Hx of Infectious Diseases: None





- Past Social History


Smoking Status: Former Smoker





- CARDIAC


Hx Hypertension: Yes





- PULMONARY


Hx Lung Cancer: Yes


Hx Pneumonia: Yes


Other/Comment: Left pneumonectomy.  Mass noted to the Rt. lung





- NEUROLOGICAL


Hx Neurological Disorder: No





- HEENT


Hx HEENT Problems: Yes (eyeglasses)





- RENAL


Hx Chronic Kidney Disease: No





- ENDOCRINE/METABOLIC


Hx Diabetes Mellitus Type 2: Yes





- HEMATOLOGICAL/ONCOLOGICAL


Hx Blood Disorders: Yes


Hx Cancer: Yes (left lung/right lung)


Hx Hepatitis C: Yes


Other/Comment: HIV - count is undetectable, left lobectomy 2014, recently dx 

with mass to right lung 9cm was to start chemotherapy next week





- INTEGUMENTARY


Hx Dermatological Problems: Yes


Other/Comment: multiple tatoos





- MUSCULOSKELETAL/RHEUMATOLOGICAL


Hx Musculoskeletal Disorders: No


Hx Falls: No





- GASTROINTESTINAL


Hx Gastrointestinal Disorders: Yes (weight loss, appetite good)





- GENITOURINARY/GYNECOLOGICAL


Hx Genitourinary Disorders: No





- PSYCHIATRIC


Hx Emotional Abuse: No


Hx Physical Abuse: No


Hx Substance Use: No





- SURGICAL HISTORY


Other/Comment: left pneumonectomy, b/l meniscus sx around the 1990's 1 year 

apart, ct guided lung bx 10/3/18





- ANESTHESIA


Hx Anesthesia Reactions: No





Meds


Allergies/Adverse Reactions: 


                                    Allergies











Allergy/AdvReac Type Severity Reaction Status Date / Time


 


No Known Allergies Allergy   Verified 10/08/18 07:29














- Medications


Medications: 


                               Current Medications





Albuterol/Ipratropium (Duoneb 3 Mg/0.5 Mg (3 Ml) Ud)  3 ml [facility] [id number] [facility]


   Last Admin: 11/01/18 07:39 Dose:  Not Given


Albuterol/Ipratropium (Duoneb 3 Mg/0.5 Mg (3 Ml) Ud)  3 ml IH Q2H PRN


   PRN Reason: Shortness of Breath


   Last Admin: 11/01/18 06:00 Dose:  3 ml


Budesonide (Pulmicort Respules)  0.5 mg IH [id number] [facility]


   Last Admin: 11/01/18 07:39 Dose:  Not Given


Efavirenz/Emtricitabine/Tenofovir (Atripla 600 Mg-200 Mg-300 Mg)  1 tab PO HS 

[facility] hospital; Protocol


   Last Admin: 10/31/18 22:03 Dose:  1 tab


Insulin Human Lispro (Humalog Low)  0 units SC ACHS [facility]; Protocol


   Last Admin: 11/01/18 08:44 Dose:  Not Given


Magnesium Oxide (Mag-Ox)  400 mg PO BID [facility]


   Last Admin: 11/01/18 11:13 Dose:  400 mg


Methylprednisolone (Solu-Medrol)  40 mg IVP Q12 [facility]


   Last Admin: 11/01/18 11:13 Dose:  40 mg


Oxycodone/Acetaminophen (Percocet 10/325 Mg Tab)  1 tab PO Q6H PRN


   PRN Reason: Pain, severe (8-10)


   Last Admin: 11/01/18 08:53 Dose:  1 tab











Physical Exam





- Constitutional


Appears: Cachectic, Chronically Ill





- Eye Exam


Eye Exam: Normal appearance, PERRL





- ENT Exam


ENT Exam: Mucous Membranes Moist, Normal Exam





- Neck Exam


Neck exam: Positive for: Normal Inspection





- Respiratory Exam


Respiratory Exam: Decreased Breath Sounds, Rhonchi, Wheezes





- Cardiovascular Exam


Cardiovascular Exam: Tachycardia, +S1, +S2





- GI/Abdominal Exam


GI & Abdominal Exam: Normal Bowel Sounds, Soft





- Extremities Exam


Extremities exam: Positive for: full ROM, pedal pulses present





- Back Exam


Back exam: NORMAL INSPECTION





- Neurological Exam


Neurological exam: Alert, Oriented x3





- Skin


Skin Exam: Dry, Pallor





- Additional Findings


Additional findings: 





Palliative performance scale rating 40%





Results





- Vital Signs


Recent Vital Signs: 


                                Last Vital Signs











Temp  97.7 F   11/01/18 06:00


 


Pulse  101 H  11/01/18 06:00


 


Resp  20   11/01/18 06:00


 


BP  134/79   11/01/18 06:00


 


Pulse Ox  95   11/01/18 06:00














- Labs


Result Diagrams: 


                                 10/30/18 08:40





                                 10/30/18 08:40


Labs: 


                         Laboratory Results - last 24 hr











  10/31/18 10/31/18 10/31/18





  07:21 11:29 16:14


 


POC Glucose (mg/dL)  148 H  187 H  179 H














  10/31/18 11/01/18 11/01/18





  21:13 07:25 11:16


 


POC Glucose (mg/dL)  153 H  175 H  140 H














Assessment & Plan





- Assessment and Plan (Free Text)


Assessment: 





 68 year old male with metastatic squamous cell lung cancer, HTN, HIV and COPD 

who is admitted with recurrent respiratory failure.





This patient is known to me form last admission. During that visit we discussed 

goals of care and advance care planning. He completed a POLST: DNR/DNI. He 

intended to continue chemotherapy.





Mr. Mejia states that he received one chemotherapy treatment last week and was 

scheduled to have another today. He been short of breath over the past few days 

and came to the hospital for further care. He states that he is very tired today

because he did not get much sleep during the night. He denies pain. He is less 

short of breath then when he arrived. He fully intends to continue chemotherapy 

treatment, but intends to discuses plan with Dr. Reynoso .





Time spent with spent in goals of care discussion, 15 minutes


Plan: 





Goals of care





Respiratory failure: Pulmonology following, recs reviewed. Continue Duonebs, 

Pulmicort, Solu Medrol, O2 therapy. Chest x rays





Squamous Cell Lung cancer: Dr Reynoso's consult pending





DM: Monitor glucose levels, Humalog coverage as needed.





Continue Atrilpla





 Valumo as needed for anxiety





Percocet as needed for pain

## 2018-11-01 NOTE — PN
DATE:  11/01/2018



PULMONARY NOTE DICTATION



SUBJECTIVE:  The patient is more short of breath this morning at the time

of my examination.  He just got back to the bed - from the commode.



PHYSICAL EXAMINATION:

VITAL SIGNS:  (Last noted in the computer):  Temperature is 97.7, pulse

101, respirations 20/22, blood pressure 134/79.  Oxygen saturation on

Ventimask is 95%.

HEENT:  Normocephalic, atraumatic.  No JVD.

CARDIOVASCULAR:  Positive S1, S2.  No S3 gallop.

LUNGS:  Less rhonchi - right lung.  No wheezing.  Decreased breath sounds -

left lung.

EXTREMITIES:  No clubbing, cyanosis or edema.  Calves are nontender to

palpation.

GI:  Abdomen is soft, nontender and nondistended.  Bowel sounds are

positive.

SKIN:  No acute rash.

NEUROLOGIC:  Exam limited at the present time.



IMPRESSION:

1.  Recurrent respiratory failure.

2.  Advanced/extensive lung cancer.

3.  Advanced chronic obstructive pulmonary disease.

4.  Mild bronchospasm.



PLAN:  The patient appears more short of breath this morning at the time of

my examination.  The nurse informed me that the patient did just come back

to bed from the commode.  The patient does get extremely short of breath with 
any

exertion.  He also suffers from oxygen desaturation with exertion.  The

night nurse agreed that the patient is not doing well overall.



On physical exam, there is actually less bronchospasm noted.  There remains

a significant alveolar-arterial gradient.  I will continue the current

nebulizer treatments and intravenous steroids for now.  We are still

awaiting a consultation by Dr. Reynoso (Oncology).  I also suggest the

consultation with Germania Mayes (Palliative Care).



Unfortunately, the future status/prognosis for this pleasant patient

remains very, very poor.  Again, hopefully Dr. Reynoso (Oncology) will see

the patient today.  Again, I also highly suggest a Palliative Care consult

at this point in time.  I will discuss the above with Dr. Lawrence.





__________________________________________

Bennie Shepherd MD





DD:  11/01/2018 7:41:17

DT:  11/01/2018 7:43:10

Job # [id number]



HEMA

## 2018-11-01 NOTE — PN
DATE:  11/01/2018



SUBJECTIVE:  A 68-year-old white male with history of lung CA, history of

left lobectomy for lung CA, previous; HIV positive; COPD, severe.  The

patient was admitted with hemoptysis, shortness of breath, hypoxia, and

difficulty breathing.  The patient has been on steroids, bronchodilators,

and antibiotics.  Seen by Dr. Shepherd.  The patient is markedly worse

today.  He is a DNR/DNI.  He is on high-flow oxygen.  He does use CPAP or

BiPAP at times to conserve his breathing.  The patient was seen on

consultation with Dr. Reynoso.  He did have one treatment for his

chemotherapy approximately last week.  He is worse today.  There was more

hemoptysis.  His chest shows decreased breath sounds, left and rhonchi,

right with minimal wheezing.  Abdomen is benign.  Extremities without

cyanosis, clubbing, or edema.  The patient is awake and alert, conversant,

but extremely short of breath and there was some active hemoptysis, but

minimal.



Plan is for palliative care.  We will reach out to Dr. Reynoso to let her

know that her patient being here.  Continue bronchodilators, antibiotics,

and oxygen therapy and we will refer him to Palliative Care or other

efforts.







__________________________________________

Rodney Lawrence MD



DD:  11/01/2018 8:33:06

DT:  11/01/2018 8:41:57

Job # [id number]

## 2018-11-02 RX ADMIN — OXYCODONE AND ACETAMINOPHEN PRN TAB: 10; 325 TABLET ORAL at 14:33

## 2018-11-02 RX ADMIN — Medication SCH MG: at 11:19

## 2018-11-02 RX ADMIN — METHYLPREDNISOLONE SODIUM SUCCINATE SCH MG: 40 INJECTION, POWDER, FOR SOLUTION INTRAMUSCULAR; INTRAVENOUS at 11:19

## 2018-11-02 RX ADMIN — INSULIN LISPRO SCH UNITS: 100 INJECTION, SOLUTION INTRAVENOUS; SUBCUTANEOUS at 17:53

## 2018-11-02 RX ADMIN — IPRATROPIUM BROMIDE AND ALBUTEROL SULFATE SCH: .5; 3 SOLUTION RESPIRATORY (INHALATION) at 13:27

## 2018-11-02 RX ADMIN — IPRATROPIUM BROMIDE AND ALBUTEROL SULFATE SCH ML: .5; 3 SOLUTION RESPIRATORY (INHALATION) at 20:11

## 2018-11-02 RX ADMIN — OXYCODONE AND ACETAMINOPHEN PRN TAB: 10; 325 TABLET ORAL at 07:32

## 2018-11-02 RX ADMIN — INSULIN LISPRO SCH: 100 INJECTION, SOLUTION INTRAVENOUS; SUBCUTANEOUS at 12:29

## 2018-11-02 RX ADMIN — BUDESONIDE SCH MG: 0.5 SUSPENSION RESPIRATORY (INHALATION) at 20:11

## 2018-11-02 RX ADMIN — OXYCODONE AND ACETAMINOPHEN PRN TAB: 10; 325 TABLET ORAL at 21:09

## 2018-11-02 RX ADMIN — INSULIN LISPRO SCH: 100 INJECTION, SOLUTION INTRAVENOUS; SUBCUTANEOUS at 08:17

## 2018-11-02 RX ADMIN — METHYLPREDNISOLONE SODIUM SUCCINATE SCH MG: 40 INJECTION, POWDER, FOR SOLUTION INTRAMUSCULAR; INTRAVENOUS at 22:14

## 2018-11-02 RX ADMIN — IPRATROPIUM BROMIDE AND ALBUTEROL SULFATE SCH ML: .5; 3 SOLUTION RESPIRATORY (INHALATION) at 11:12

## 2018-11-02 RX ADMIN — Medication SCH MG: at 17:54

## 2018-11-02 RX ADMIN — INSULIN LISPRO SCH: 100 INJECTION, SOLUTION INTRAVENOUS; SUBCUTANEOUS at 21:47

## 2018-11-02 RX ADMIN — IPRATROPIUM BROMIDE AND ALBUTEROL SULFATE SCH ML: .5; 3 SOLUTION RESPIRATORY (INHALATION) at 03:58

## 2018-11-02 RX ADMIN — EFAVIRENZ, EMTRICITABINE, AND TENOFOVIR DISOPROXIL FUMARATE SCH TAB: 600; 200; 300 TABLET, FILM COATED ORAL at 21:04

## 2018-11-02 NOTE — PN
DATE:  11/02/2018



SUBJECTIVE:  The patient has no complaints of any chest pain or shortness

of breath.  No headaches or dizziness.



PHYSICAL EXAMINATION:

VITAL SIGNS:  Temperature is 97.7, pulse of 100, blood pressure 134/94 and

respirations 20.

GENERAL:  The patient is lying in bed, flat, comfortable.

HEENT:  No oral lesion.  Anicteric sclerae.  Moist mucosa.

NECK:  No JVD, adenopathy, or thyromegaly.

CARDIOVASCULAR:  S1 and S2, regular.  No murmurs, rubs, or gallops.

LUNGS:  Clear to auscultation bilaterally.  No wheeze, rales, or rhonchi.

ABDOMEN:  Bowel sounds are positive, soft, nontender and nondistended.

EXTREMITIES:  no cyanosis, clubbing or edema.



LABORATORY DATA:  White count of 12.4.



ASSESSMENT:

1.  Acute chronic obstructive pulmonary disease.

2.  Human immunodeficiency virus.

3.  Lung cancer.



PLAN:  The patient is currently comfortable, is going to be on nebulizer

treatment.  The patient is on magnesium replacement.  He is on steroids. 

He says he is feeling better.  He is on BiPAP.  He is being followed by

cardiology.







__________________________________________

Franklyn Dominique MD





DD:  11/02/2018 9:04:15

DT:  11/02/2018 9:07:51

Job # [id number]

## 2018-11-02 NOTE — PN
DATE:  11/02/2018



PULMONARY NOTE



SUBJECTIVE:  The patient appears much more comfortable this morning -

compared to yesterday.  He is not short of breath at rest.  He is awake and

alert.



PHYSICAL EXAMINATION:

VITAL SIGNS:  Temperature is 97.7, pulse 95, respirations 18/20, blood

pressure 134/94.  Oxygen saturation on nasal cannula is 96%.

HEENT:  Normocephalic, atraumatic.  No JVD.

CARDIOVASCULAR:  Positive S1, S2.  No S3 gallop.

LUNGS:  Much less/minimal rhonchi - right lung.  No wheezing.  Decreased

breath sounds - left lung.

EXTREMITIES:  No clubbing, cyanosis or edema.  Calves are nontender to

palpation.

GI:  Abdomen is soft, nontender and nondistended.  Bowel sounds are

positive.

SKIN:  No acute rash.

NEUROLOGIC:  Limited at the present time.



IMPRESSION:

1.  Recurrent respiratory failure.

2.  Advanced/extensive lung cancer.

3.  Advanced chronic obstructive pulmonary disease.

4.  Mild bronchospasm.



PLAN:  The patient appears much more comfortable this morning - compared to

yesterday.  He is not short of breath at rest.  He does state to feeling

much, much better overall.  I did discuss the case with the night nurse at

length.  The night nurse stated that the patient had a very good night.



On physical exam, there is certainly less bronchospasm noted.  In addition,

the alveolar-arterial gradient is much less.  Oxygen saturation on nasal

cannula is now 96%.  I will continue with the current nebulizer treatments

and inhaled steroids for now.  I will also try decreasing the intravenous

steroids this morning.



Inputs by Palliative Care and Internal Medicine are noted.  The patient

also spoke with Dr. Reynoso (Oncology) by phone yesterday.  The patient is

certainly improved clinically - compared to the initial presentation. 

However, again, unfortunately, the future status/prognosis for this patient

remains very, very poor.  All are aware.  I will discuss the above with the

attending physician.





__________________________________________

Bennie Shepherd MD





DD:  11/02/2018 7:08:35

DT:  11/02/2018 7:10:16

Job # [id number]



MTDALY

## 2018-11-03 LAB
ALBUMIN SERPL-MCNC: 3.2 G/DL (ref 3–4.8)
ALBUMIN/GLOB SERPL: 1 {RATIO} (ref 1.1–1.8)
ALT SERPL-CCNC: 80 U/L (ref 7–56)
AST SERPL-CCNC: 47 U/L (ref 17–59)
BUN SERPL-MCNC: 30 MG/DL (ref 7–21)
CALCIUM SERPL-MCNC: 9.5 MG/DL (ref 8.4–10.5)
ERYTHROCYTE [DISTWIDTH] IN BLOOD BY AUTOMATED COUNT: 14.2 % (ref 11.5–14.5)
GFR NON-AFRICAN AMERICAN: > 60
HGB BLD-MCNC: 13.9 G/DL (ref 14–18)
MCH RBC QN AUTO: 23.1 PG (ref 25–35)
MCHC RBC AUTO-ENTMCNC: 30.9 G/DL (ref 31–37)
MCV RBC AUTO: 74.8 FL (ref 80–105)
PLATELET # BLD: 191 10^3/UL (ref 120–450)
PMV BLD AUTO: 10.8 FL (ref 7–11)
RBC # BLD AUTO: 6.02 10^6/UL (ref 3.5–6.1)
WBC # BLD AUTO: 7.3 10^3/UL (ref 4.5–11)

## 2018-11-03 RX ADMIN — IPRATROPIUM BROMIDE AND ALBUTEROL SULFATE SCH ML: .5; 3 SOLUTION RESPIRATORY (INHALATION) at 18:49

## 2018-11-03 RX ADMIN — OXYCODONE AND ACETAMINOPHEN PRN TAB: 10; 325 TABLET ORAL at 08:55

## 2018-11-03 RX ADMIN — OXYCODONE AND ACETAMINOPHEN PRN TAB: 10; 325 TABLET ORAL at 22:19

## 2018-11-03 RX ADMIN — IPRATROPIUM BROMIDE AND ALBUTEROL SULFATE SCH ML: .5; 3 SOLUTION RESPIRATORY (INHALATION) at 14:10

## 2018-11-03 RX ADMIN — OXYCODONE AND ACETAMINOPHEN PRN TAB: 10; 325 TABLET ORAL at 14:36

## 2018-11-03 RX ADMIN — INSULIN LISPRO SCH: 100 INJECTION, SOLUTION INTRAVENOUS; SUBCUTANEOUS at 07:30

## 2018-11-03 RX ADMIN — IPRATROPIUM BROMIDE AND ALBUTEROL SULFATE SCH ML: .5; 3 SOLUTION RESPIRATORY (INHALATION) at 18:55

## 2018-11-03 RX ADMIN — INSULIN LISPRO SCH: 100 INJECTION, SOLUTION INTRAVENOUS; SUBCUTANEOUS at 22:00

## 2018-11-03 RX ADMIN — Medication SCH MG: at 11:21

## 2018-11-03 RX ADMIN — Medication SCH MG: at 18:07

## 2018-11-03 RX ADMIN — INSULIN LISPRO SCH UNITS: 100 INJECTION, SOLUTION INTRAVENOUS; SUBCUTANEOUS at 17:06

## 2018-11-03 RX ADMIN — BUDESONIDE SCH MG: 0.5 SUSPENSION RESPIRATORY (INHALATION) at 18:55

## 2018-11-03 RX ADMIN — METHYLPREDNISOLONE SODIUM SUCCINATE SCH MG: 40 INJECTION, POWDER, FOR SOLUTION INTRAMUSCULAR; INTRAVENOUS at 22:20

## 2018-11-03 RX ADMIN — IPRATROPIUM BROMIDE AND ALBUTEROL SULFATE SCH ML: .5; 3 SOLUTION RESPIRATORY (INHALATION) at 08:21

## 2018-11-03 RX ADMIN — INSULIN LISPRO SCH: 100 INJECTION, SOLUTION INTRAVENOUS; SUBCUTANEOUS at 12:23

## 2018-11-03 RX ADMIN — BUDESONIDE SCH MG: 0.5 SUSPENSION RESPIRATORY (INHALATION) at 18:49

## 2018-11-03 RX ADMIN — METHYLPREDNISOLONE SODIUM SUCCINATE SCH MG: 40 INJECTION, POWDER, FOR SOLUTION INTRAMUSCULAR; INTRAVENOUS at 11:20

## 2018-11-03 RX ADMIN — BUDESONIDE SCH MG: 0.5 SUSPENSION RESPIRATORY (INHALATION) at 08:21

## 2018-11-03 RX ADMIN — EFAVIRENZ, EMTRICITABINE, AND TENOFOVIR DISOPROXIL FUMARATE SCH TAB: 600; 200; 300 TABLET, FILM COATED ORAL at 22:20

## 2018-11-03 RX ADMIN — IPRATROPIUM BROMIDE AND ALBUTEROL SULFATE SCH: .5; 3 SOLUTION RESPIRATORY (INHALATION) at 01:54

## 2018-11-03 NOTE — PN
DATE:  11/03/2018



The patient is a 68-year-old, patient of Dr. Lawrence.  I am covering for

him.  He is lying in bed, seems to be comfortable.  The patient states he

got his chemotherapy and after that his condition got worse and he started

to have increasing shortness of breath, so he came to emergency room for

further evaluation.



PHYSICAL EXAMINATION:

GENERAL:  He is awake, alert, oriented, able to communicate.

VITAL SIGNS:  He is afebrile, pulse 92, respirations 20, blood pressure

134/92.

LUNGS:  Bilateral decreased breath sounds, few expiratory rhonchi.

HEART:  S1 and S2 audible.

ABDOMEN:  Soft and nontender.  No rebound or guarding.

NEUROLOGIC:  The patient is awake, alert, oriented, able to communicate.



LABORATORY EXAMINATION:  WBC 7.3, hemoglobin 13.9, hematocrit 45, platelets

of 191.  Chemistries, sodium 139, potassium 4.9, chloride 103, CO2 of 32,

BUN 30, creatinine 0.9, blood sugar of 141.  Blood cultures are negative.



ASSESSMENT:

1.  Chronic obstructive pulmonary disease exacerbation.

2.  Human immunodeficiency virus positive.

3.  History of pleural effusion.

4.  Right lung squamous cell carcinoma, getting chemotherapy by Dr. Reynoso.



PLAN:  Currently, the patient is on nebulizer treatment.  He is on HIV

medication.  He is on IV steroids.  Request for physical therapy evaluation

and reevaluate the patient in a.m.





__________________________________________

Radhika Vegas MD





DD:  11/03/2018 11:40:58

DT:  11/03/2018 11:43:35

Job # [id number]

## 2018-11-03 NOTE — PN
DATE:  11/03/2018



PULMONARY PROGRESS NOTE



SUBJECTIVE:  The patient was seen and examined at bedside.  He feels

comfortable.  There is no acute shortness of breath.  He complains of

difficulty raising secretions and that is why he coughs more.



PHYSICAL EXAMINATION:

VITAL SIGNS:  His temperature is 98, pulse 94, respirations 20, blood

pressure is 130/80.  Oxygen saturation on nasal cannula is 95%.

HEENT:  Head normocephalic and atraumatic.

NECK:  Supple with no jugular vein distentions.

CARDIOVASCULAR:  S1, S2.  No S3 and no murmurs.

PULMONARY:  Few basilar rhonchi.  No wheezing.  Decreased breath sounds on

the left side.

EXTREMITIES:  No clubbing, cyanosis or edema.

GI:  Soft, nontender.  No organomegaly.

SKIN:  No acute skin rash.

NEUROLOGIC:  No focal deficits.



ASSESSMENT:

1.  Recurrent respiratory failure.

2.  Advanced lung cancer.

3.  Advanced chronic obstructive pulmonary disease.

4.  Resolved bronchospasm.



PLAN:  The patient appears more comfortable.  His oxygen saturation is good

on nasal cannula.  He is getting nebulizer treatment.  Plans for additional

chemotherapy noted.







__________________________________________

Reji Villagran MD









DD:  11/03/2018 8:46:35

DT:  11/03/2018 9:28:15

Job # [id number]

## 2018-11-04 VITALS — RESPIRATION RATE: 19 BRPM

## 2018-11-04 RX ADMIN — INSULIN LISPRO SCH UNITS: 100 INJECTION, SOLUTION INTRAVENOUS; SUBCUTANEOUS at 19:09

## 2018-11-04 RX ADMIN — OXYCODONE AND ACETAMINOPHEN PRN TAB: 10; 325 TABLET ORAL at 22:26

## 2018-11-04 RX ADMIN — INSULIN LISPRO SCH: 100 INJECTION, SOLUTION INTRAVENOUS; SUBCUTANEOUS at 12:45

## 2018-11-04 RX ADMIN — INSULIN LISPRO SCH: 100 INJECTION, SOLUTION INTRAVENOUS; SUBCUTANEOUS at 21:59

## 2018-11-04 RX ADMIN — OXYCODONE AND ACETAMINOPHEN PRN TAB: 10; 325 TABLET ORAL at 14:36

## 2018-11-04 RX ADMIN — Medication SCH MG: at 11:42

## 2018-11-04 RX ADMIN — OXYCODONE AND ACETAMINOPHEN PRN TAB: 10; 325 TABLET ORAL at 08:40

## 2018-11-04 RX ADMIN — IPRATROPIUM BROMIDE AND ALBUTEROL SULFATE SCH ML: .5; 3 SOLUTION RESPIRATORY (INHALATION) at 13:19

## 2018-11-04 RX ADMIN — BUDESONIDE SCH MG: 0.5 SUSPENSION RESPIRATORY (INHALATION) at 20:11

## 2018-11-04 RX ADMIN — BUDESONIDE SCH MG: 0.5 SUSPENSION RESPIRATORY (INHALATION) at 07:55

## 2018-11-04 RX ADMIN — METHYLPREDNISOLONE SODIUM SUCCINATE SCH MG: 40 INJECTION, POWDER, FOR SOLUTION INTRAMUSCULAR; INTRAVENOUS at 22:26

## 2018-11-04 RX ADMIN — IPRATROPIUM BROMIDE AND ALBUTEROL SULFATE SCH ML: .5; 3 SOLUTION RESPIRATORY (INHALATION) at 07:55

## 2018-11-04 RX ADMIN — IPRATROPIUM BROMIDE AND ALBUTEROL SULFATE SCH: .5; 3 SOLUTION RESPIRATORY (INHALATION) at 01:21

## 2018-11-04 RX ADMIN — INSULIN LISPRO SCH: 100 INJECTION, SOLUTION INTRAVENOUS; SUBCUTANEOUS at 08:21

## 2018-11-04 RX ADMIN — METHYLPREDNISOLONE SODIUM SUCCINATE SCH MG: 40 INJECTION, POWDER, FOR SOLUTION INTRAMUSCULAR; INTRAVENOUS at 11:42

## 2018-11-04 RX ADMIN — EFAVIRENZ, EMTRICITABINE, AND TENOFOVIR DISOPROXIL FUMARATE SCH TAB: 600; 200; 300 TABLET, FILM COATED ORAL at 22:26

## 2018-11-04 RX ADMIN — Medication SCH MG: at 19:09

## 2018-11-04 NOTE — PN
DATE:  11/04/2018



SUBJECTIVE:  The patient is a 68-year-old, seen and examined, has mild

shortness of breath and complained of cough.



PHYSICAL EXAMINATION:

VITAL SIGNS:  He is afebrile, pulse 85, respirations 19, and blood pressure

131/89.

LUNGS:  Bilateral fair airflow, decreased at bases.

HEART:  S1 and S2 audible.

ABDOMEN:  Soft and nontender.  No rebound.  No guarding.

NEUROLOGIC:  The patient is awake, alert, and communicative.

EXTREMITIES:  Bilateral legs, no edema.



LABORATORY DATA:  Blood sugar is 119.  Blood cultures are negative.



ASSESSMENT:

1.  Chronic obstructive pulmonary disease exacerbation.

2.  Human immunodeficiency virus positive.

3.  Pleural effusion.

4.  History of right lung squamous cell carcinoma, currently on

chemotherapy due for chemotherapy on Monday.

5.  Asthmatic bronchitis.



PLAN:  Currently, the patient is on nebulizer treatment.  He is on IV

steroids.  We need to encourage the patient for ambulation.  Physical

therapy has been requested.  Monitor the patient for another 24 hours.  If

remains _____ stable, he will be discharge tomorrow to receive his

chemotherapy.







__________________________________________

Radhika Vegas MD





DD:  11/04/2018 14:01:31

DT:  11/04/2018 18:34:03

Job # [id number]

## 2018-11-05 VITALS
HEART RATE: 94 BPM | TEMPERATURE: 97.9 F | SYSTOLIC BLOOD PRESSURE: 137 MMHG | DIASTOLIC BLOOD PRESSURE: 88 MMHG | OXYGEN SATURATION: 97 %

## 2018-11-05 RX ADMIN — Medication SCH MG: at 09:38

## 2018-11-05 RX ADMIN — IPRATROPIUM BROMIDE AND ALBUTEROL SULFATE SCH: .5; 3 SOLUTION RESPIRATORY (INHALATION) at 01:58

## 2018-11-05 RX ADMIN — BUDESONIDE SCH MG: 0.5 SUSPENSION RESPIRATORY (INHALATION) at 07:41

## 2018-11-05 RX ADMIN — IPRATROPIUM BROMIDE AND ALBUTEROL SULFATE SCH ML: .5; 3 SOLUTION RESPIRATORY (INHALATION) at 07:41

## 2018-11-05 RX ADMIN — INSULIN LISPRO SCH: 100 INJECTION, SOLUTION INTRAVENOUS; SUBCUTANEOUS at 07:43

## 2018-11-05 NOTE — PN
DATE:  11/05/2018



SUBJECTIVE:  The patient appears very comfortable this morning.  He is not

short of breath at rest.



PHYSICAL EXAMINATION:

VITAL SIGNS:  Temperature is 97.9, pulse 94, respirations 19, blood

pressure 137/88.  Oxygen saturation on nasal cannula is 97%.

HEENT:  Normocephalic, atraumatic.  No JVD.

CARDIOVASCULAR:  Positive S1, S2.  No S3 gallop.

LUNGS:  Much less/very minimal rhonchi - right lung.  No wheezing. 

Decreased breath sounds - left lung.

EXTREMITIES:  No clubbing, cyanosis or edema.  Calves are nontender to

palpation.

GI:  Abdomen is soft, nontender, nondistended.  Bowel sounds are positive.

SKIN:  No acute rash.

NEUROLOGIC:  Exam limited at the present time.



IMPRESSION:

1.  Recurrent respiratory failure.

2.  Advanced/extensive lung cancer.

3.  Advanced chronic obstructive pulmonary disease.

4.  Mild bronchospasm.



PLAN:  The patient appears very comfortable this morning.  He is not short

of breath at rest.  He does state to feeling much, much better overall.  I

did discuss the case with the night nurse at length.  The night nurse

stated that the patient had a very good night.



On physical exam, his bronchospasm continues to resolve.  In addition, the

alveolar-arterial gradient continues to resolve.  I will continue with the

current nebulizer treatments and change to oral steroids this morning.



Clinical status of the patient is significantly improved - compared to the

initial presentation.  Unfortunately, the future status/prognosis for this

patient remains very poor.  The patient stated that he must leave today to

see Dr. Reynoso (Oncology)  - for additional chemotherapy.  I will

discuss the above with the attending physician.







__________________________________________

Bennie Shepherd MD



DD:  11/05/2018 7:40:00

DT:  11/05/2018 7:41:56

Job # [id number]



HEMA

## 2018-11-05 NOTE — DS
HISTORY OF PRESENT ILLNESS:  A 68-year-old white male, seen in his bed with

supplemental oxygen.  Comfortable, awake, alert and oriented x3.  The

patient is anxious to be discharged today.  He does have an appointment in

approximately 2 hours from now with Dr. Reynoso his oncologist in her

office.  The patient did some physical therapy and occupational therapy

yesterday.  He is still using oxygen.  He does have a wheelchair at home. 

He has supplemental oxygen at home and also in the car.  The patient will

be stable for discharge.



PHYSICAL EXAMINATION:

VITAL SIGNS:  He is afebrile.  Vital signs are stable.  His heart rate is

94, his blood pressure is 137/88, his temperature is 97.9.

NEUROLOGIC:  Patient awake, alert and oriented x3.



LABORATORY DATA:  White count 7.3.  His electrolytes are intact.  His

hemoglobin is 13.9.  His BUN and creatinine are 30 and 0.9.



The patient's chest is significantly improved, his right chest.  He does

have a left pneumonectomy.  He has had one course of therapy for his lung

cancer.  He is going to receive a second from Dr. Reynoso today.  He was

seen by Dr. Germania Mayes for palliative care.  He understands his

rights.  He is a DNR/DNI.  He does have home care at home.  The patient is

otherwise unchanged.  The patient will be discharged today, and to be

followed with Dr. Reynoso this morning, and the patient will follow up with

us within a week.



FINAL DISCHARGE DIAGNOSES:  Exacerbation of chronic obstructive pulmonary

disease, bronchitis, squamous cell lung cancer, history of lung cancer and

pneumonectomy, history of chronic obstructive pulmonary disease, and

cachexia from lung cancer.







__________________________________________

Rodney Lawrence MD





DD:  11/05/2018 9:04:42

DT:  11/05/2018 10:27:53

Job # [id number]

## 2020-06-08 NOTE — CP.PCM.PN
Subjective





- Date & Time of Evaluation


Date of Evaluation: 10/12/18


Time of Evaluation: 09:00





- Subjective


Subjective: 





Shortness of breath improved. Was onnhigh flow oxygen, currently lowered to 60% 

O2. tolerating well. 


No fever. Cough present. 





Objective





- Vital Signs/Intake and Output


Vital Signs (last 24 hours): 


                                        











Temp Pulse Resp BP Pulse Ox


 


 97.4 F L  89   21   135/85   100 


 


 10/14/18 12:00  10/14/18 22:00  10/14/18 23:19  10/14/18 12:00  10/14/18 06:00








Intake and Output: 


                                        











 10/14/18 10/15/18





 18:59 06:59


 


Intake Total 350 


 


Balance 350 














- Medications


Medications: 


                               Current Medications





Acetaminophen (Tylenol 325mg Tab)  650 mg PO Q6H PRN


   PRN Reason: Pain, Mild (1-3)


   Last Admin: 10/14/18 12:25 Dose:  650 mg


Albuterol/Ipratropium (Duoneb 3 Mg/0.5 Mg (3 Ml) Ud)  3 ml IH [id number] [facility]


   Last Admin: 10/14/18 19:30 Dose:  3 ml


Albuterol/Ipratropium (Duoneb 3 Mg/0.5 Mg (3 Ml) Ud)  3 ml IH Q2H PRN


   PRN Reason: Shortness of Breath


Calcium/Vitamin D (Oscal-D 250 Mg-125 Units Tab)  2 tab PO DAILY [facility]


   Last Admin: 10/14/18 10:08 Dose:  2 tab


Diazepam (Valium)  5 mg PO HS [facility]; Protocol


   Last Admin: 10/14/18 20:59 Dose:  5 mg


Efavirenz/Emtricitabine/Tenofovir (Atripla 600 Mg-200 Mg-300 Mg)  1 tab PO HS 

[facility]; Protocol


   Last Admin: 10/14/18 21:00 Dose:  1 tab


Heparin Sodium (Porcine) (Heparin)  5,000 units SC Q8 [facility]; Protocol


   Last Admin: 10/14/18 21:00 Dose:  5,000 units


Insulin Human Lispro (Humalog Low)  0 units SC ACHS [facility]; Protocol


   Last Admin: 10/14/18 21:48 Dose:  Not Given


Levofloxacin (Levaquin)  500 mg PO DAILY [facility]; Protocol


   Stop: 10/20/18 10:01


Magnesium Oxide (Mag-Ox)  400 mg PO BID [facility]


   Last Admin: 10/14/18 17:31 Dose:  400 mg


Methylprednisolone (Solu-Medrol)  40 mg IVP Q12 [facility]


   Last Admin: 10/14/18 21:00 Dose:  40 mg


Oxycodone/Acetaminophen (Percocet 5/325 Mg Tab)  2 tab PO Q4H PRN


   PRN Reason: Pain, Mild (1-3)


   Stop: 10/17/18 16:01


   Last Admin: 10/14/18 17:31 Dose:  2 tab


Pantoprazole Sodium (Protonix Ec Tab)  40 mg PO ACB [facility]


   Last Admin: 10/14/18 08:46 Dose:  40 mg











- Labs


Labs: 


                                        





                                 10/14/18 06:00 





                                 10/14/18 06:00 











- Constitutional


Appears: Chronically Ill





- Head Exam


Head Exam: ATRAUMATIC, NORMAL INSPECTION, NORMOCEPHALIC





- Eye Exam


Eye Exam: Normal appearance, PERRL


Pupil Exam: NORMAL ACCOMODATION





- ENT Exam


ENT Exam: Mucous Membranes Dry





- Neck Exam


Neck Exam: Normal Inspection





- Respiratory Exam


Respiratory Exam: Accessory Muscle Use, Decreased Breath Sounds, Wheezes





- Cardiovascular Exam


Cardiovascular Exam: REGULAR RHYTHM, +S1, +S2





- Extremities Exam


Extremities Exam: Normal Inspection





- Back Exam


Back Exam: NORMAL INSPECTION





- Neurological Exam


Neurological Exam: Alert, Awake, Normal Gait, Oriented x3





Assessment and Plan





- Assessment and Plan (Free Text)


Assessment: 





1. recurrent advanced lung cancer. ON high flow oxygen. Respiratory distress 

improved. Dr. Shepherd following. 


Chemo once status improves. 





2. Left lower lobe PNA : on IV antibiotics as per ID, Dr. Cuellar note reviewed. 





3. Blood counts stable.


4. Patient made himself DNR/DNI. 





Status improved. prognosis poor. The wire is reinserted in the  aorta.

## 2022-05-31 NOTE — HP
HISTORY OF PRESENT ILLNESS:  A 68-year-old white male with remote history

of left lung CA status post pneumonectomy several years ago.  Patient also

has a history being HIV positive, hypertension, and non-insulin-dependent

diabetes mellitus for many years.  Patient recently was found to have a

mass in the contralateral lung with mediastinal adenopathy.  Patient had a

recent biopsy which showed squamous cell carcinoma of the lung, had a PET

scan which showed the same.  Patient recently was in the emergency room at

[facility] approximately a week ago, had a lung biopsy and was

at that point sent to [facility] where patient was admitted

for several days and met Dr. Reynoso and decided to start chemotherapy with

Dr. Reynoso.  Patient was seen, evaluated and eventually discharged from the

hospital to start outpatient chemotherapy.  Over the last 24 to 48 hours,

patient became progressively more short of breath and dyspneic with

productive cough.  Denied any fever or chills.  Came to the emergency room

and was found to be desaturating into the 50s, was put on BiPAP in the

emergency room and admitted to the Intensive Care Unit.



REVIEW OF SYSTEMS:  Positive only for cough, shortness of breath and some

palpitations.  Patient denies chest pain, diaphoresis, nausea or vomiting. 

Patient denies any diarrhea or vomiting, does complain of some loss of

appetite and some weakness of his voice.  Review of systems for neurology

is negative for headache, blurred vision, double vision, weakness in the

arms or legs, incoordination or head pain.  Respiratory system is as

stated.  GI system is as stated above.  Patient also denies any bruising or

bleeding.  Denies any burning or dysuria.



SOCIAL HISTORY:  Patient has a history of being HIV positive.  He has no

travel history.  He is a former smoker.  Nonsmoker, nondrinker at this

point.  No illicit drugs at this point.



ALLERGIES:  NO ALLERGIES.



PHYSICAL EXAMINATION:

GENERAL:  A thin white male in moderate to severe respiratory distress with

BiPAP, barely audible voice, tentative by his wife and daughter.

CHEST:  Shows decreased breath sounds throughout the thorax with some

rhonchi in the right thorax.

ABDOMEN:  Benign.

HEART:  Regular sinus rhythm, but sinus tachycardia.

EXTREMITIES:   Without cyanosis, clubbing, or edema.

NEUROLOGIC:  Grossly intact.  Patient is unable to stand or to coordinate

because of his severe respiratory distress.



IMPRESSION:  A 68-year-old white male, human immunodeficiency virus

positive with a history of lung cancer status post lobectomy with recurrent

lung cancer, possibly new primary versus metastasis with large amount of

mediastinal adenopathy on CT-PET presenting with respiratory failure and

hypoxia, possible pneumonia.  Recent x-ray shows alveolar infiltrates. 

Patient's recent pathology did show squamous cell carcinoma of the lung,

markers are pending.  Patient's PET-CT did show a left pneumonectomy and

showed a large 9 x 8 x 8 multilobe FDG-avid mass in the anterior selective

left lower lobe with the extension to the right hilum, there is also

adenopathy, metastatic adenopathy in the mediastinum in the precarinal and

pretracheal region, extending into the pretracheal region abutting the

extending thoracic aorta.  There is also a large subcarinal mass extending

posterior to the right azygoesophageal recess, there is also a mass like

appearance of short segment of the esophagus.



PLAN:  To ICU, IV antibiotics, bronchodilators, possible start of

chemotherapy with Dr. Reynoso and treatment of underlying possible

pneumonia.







__________________________________________

Rodney Lawrence MD



DD:  10/08/2018 12:45:03

DT:  10/08/2018 21:24:42

Job # [id number] no

## 2022-09-13 NOTE — PN
Chief Complaint:  Patient is a 63y old  Male who presents with a chief complaint of Pt 64 y/o M with PMH as per chart: "covid-19, decompensated cirrhosis and cholangitis S/P ERCP with stent placement (4/2022), S/P stent removal on (07/20), S/P cholecystostomy tube (08/09) with check (08/29) in correct location. He presented to OSH with bloody discharge from the wound site and abdominal distention,  found to have potentially dislodged IR cholecystostomy tube and moderate ascites, S/P perihepatic collection drainage, abdominal collection drainage, cholecystostomy tube re-positioning (09/11)."     Currently Listed for OLT. MELD-Na: 26, (09/12). Pt seen for nutrition liver transplant evaluation on previous admission (08/12/2022).   Pt seen by PT for frailty assessments: (09/01/2022) pt scored 4.42 pre- frail -> (09/12) pt scored 4.69 frail.    (13 Sep 2022 10:07)    Interval Events:   9/11 abscess cx growing E coli, Bcx remain negative    Hospital Medications:  ampicillin/sulbactam  IVPB 3 Gram(s) IV Intermittent every 6 hours  ampicillin/sulbactam  IVPB      fluconAZOLE IVPB      fluconAZOLE IVPB 400 milliGRAM(s) IV Intermittent every 24 hours  furosemide    Tablet 40 milliGRAM(s) Oral daily  influenza   Vaccine 0.5 milliLiter(s) IntraMuscular once  magnesium oxide 400 milliGRAM(s) Oral three times a day with meals  multivitamin 1 Tablet(s) Oral daily  pantoprazole    Tablet 40 milliGRAM(s) Oral before breakfast  polyethylene glycol 3350 17 Gram(s) Oral two times a day  senna 2 Tablet(s) Oral at bedtime  spironolactone 100 milliGRAM(s) Oral daily  tamsulosin 0.4 milliGRAM(s) Oral at bedtime      ROS:   Complete and normal except as mentioned above.    PHYSICAL EXAM:   Vital Signs:  Vital Signs Last 24 Hrs  T(C): 36.7 (13 Sep 2022 12:23), Max: 37.3 (13 Sep 2022 09:00)  T(F): 98.1 (13 Sep 2022 12:23), Max: 99.1 (13 Sep 2022 09:00)  HR: 98 (13 Sep 2022 12:23) (98 - 106)  BP: 150/83 (13 Sep 2022 12:23) (121/76 - 150/83)  BP(mean): --  RR: 18 (13 Sep 2022 12:23) (18 - 18)  SpO2: 97% (13 Sep 2022 12:23) (93% - 97%)    Parameters below as of 13 Sep 2022 12:23  Patient On (Oxygen Delivery Method): room air      Daily     Daily     Constitutional: Well developed / well nourished  HEENT: no scleral icterus  Respiratory: normal respiratory effort  Cardiovascular: regular rate  Gastrointestinal: Soft abdomen, mild distended, IR drains with sanguinous purulent substance, c tube to bag  Genitourinary: Voiding spontaneously  Extremities: SCD's in place and working bilaterally  Neurological: A&O x3  Skin: no rashes, ulcerations, lesions       LABS: reviewed                        8.6    9.81  )-----------( 89       ( 13 Sep 2022 06:29 )             26.1     09-13    130<L>  |  96  |  11  ----------------------------<  110<H>  3.2<L>   |  25  |  0.68    Ca    8.4      13 Sep 2022 06:32  Phos  2.0     09-13  Mg     1.9     09-13    TPro  6.6  /  Alb  1.9<L>  /  TBili  2.1<H>  /  DBili  x   /  AST  22  /  ALT  13  /  AlkPhos  77  09-13    LIVER FUNCTIONS - ( 13 Sep 2022 06:32 )  Alb: 1.9 g/dL / Pro: 6.6 g/dL / ALK PHOS: 77 U/L / ALT: 13 U/L / AST: 22 U/L / GGT: x             Interval Diagnostic Studies: see sunrise for full report   DATE:  10/17/2018



SUBJECTIVE:  The patient is in bed in no acute distress, nontoxic.



PHYSICAL EXAMINATION:

VITAL SIGNS:  Temperature is 98, blood pressure is 120/70, respiratory rate

of 18.

HEENT:  Examination of HEENT is unremarkable.

NECK:  Supple.

LUNGS:  Have decreased breath sounds.

HEART:  Normal S1, S2.

ABDOMEN:  Soft, nontender.



LABORATORY EXAMINATION:  Reveals a white count of 12,400 and hemoglobin of

14.  Chemistries are noted.



ASSESSMENT AND PLAN:  A 68-year-old male admitted with severe sepsis and

acute hypoxic respiratory failure, right lower lobe healthcare-associated

pneumonia, human immunodeficiency virus, metastatic lung cancer, lung

resection, newly discovered right lung mass and has received meropenem

p.o., on p.o. Levaquin at this point and I have discussed with the patient

regarding the use of Atripla.  We will discuss it with his primary

caretaker.





__________________________________________

Sang Whitlock MD





DD:  10/17/2018 22:41:58

DT:  10/17/2018 22:44:33

Job # [id number]